# Patient Record
Sex: FEMALE | Race: BLACK OR AFRICAN AMERICAN | Employment: FULL TIME | URBAN - METROPOLITAN AREA
[De-identification: names, ages, dates, MRNs, and addresses within clinical notes are randomized per-mention and may not be internally consistent; named-entity substitution may affect disease eponyms.]

---

## 2018-07-11 ENCOUNTER — HOSPITAL ENCOUNTER (OUTPATIENT)
Dept: PHYSICAL THERAPY | Age: 58
Discharge: HOME | End: 2018-07-11
Payer: COMMERCIAL

## 2018-07-11 PROCEDURE — 97110 THERAPEUTIC EXERCISES: CPT

## 2018-07-11 PROCEDURE — 97162 PT EVAL MOD COMPLEX 30 MIN: CPT

## 2018-07-11 NOTE — PROGRESS NOTES
[facility] - [facility] Farhad Link [address]  [phone] fax  Plan of Care/ Statement of Necessity for Physical Therapy Services     Patient name: Marta Lopez Start of Care: 2018   Referral source: Anuel Potter : 1960    Medical Diagnosis: Low back pain [M54.5]  Right hip pain [M25.551]   Onset Date:18    Treatment Diagnosis: LBP/ Right LE pain   Prior Hospitalization: see medical history Provider#: [id number]   Medications: Verified on Patient summary List    Comorbidities: arthritis, HTN, lupus, depression   Prior Level of Function: Functionally independent. Lives with spouse in single level home with steps to enter. Works full time as a . The Plan of Care and following information is based on the information from the initial evaluation. Assessment/ key information: Patient is a pleasant 61year old female presenting with LBP and Right LE pain progressing over the last year with no known mechanism of injury. Patient has a history of back surgery in  for spinal stenosis, and reports no back pain for the first few years following. Since the onset of this LBP and Right leg pain about a year ago, patient has experienced difficulty walking more than 30 min and standing for more than 5-10 min. Additionally, she experiences difficulty negotiating stairs and sleeping. Symptoms can be relieved by sitting with feet elevated in a recliner. At evaluation, patient demonstrated full and pain free lumbar AROM. Left LE strength grossly 5/5 throughout without pain, but Right LE strength was as follows: knee flexion 4+/5, knee extension 4/5, hip flexion 4/5, hip extension 5/5, hip abduction 5/5. Overall patient is a good rehab candidate due to PLOF and will benefit from skilled physical therapy to address the above deficits.       Evaluation Complexity History MEDIUM  Complexity : 1-2 comorbidities / personal factors will impact the outcome/ POC ; Examination LOW Complexity : 1-2 Standardized tests and measures addressing body structure, function, activity limitation and / or participation in recreation  ;Presentation LOW Complexity : Stable, uncomplicated  ;Clinical Decision Making MEDIUM Complexity : FOTO score of 26-74  Overall Complexity Rating: MEDIUM  Problem List: pain affecting function, decrease ROM, decrease strength, edema affecting function, impaired gait/ balance, decrease ADL/ functional abilitiies, decrease activity tolerance, decrease flexibility/ joint mobility and decrease transfer abilities   Treatment Plan may include any combination of the following: Therapeutic exercise, Therapeutic activities, Neuromuscular re-education, Physical agent/modality, Gait/balance training, Manual therapy, Aquatic therapy, Patient education, Self Care training, Functional mobility training, Home safety training and Stair training  Patient / Family readiness to learn indicated by: asking questions, trying to perform skills and interest  Persons(s) to be included in education: patient (P)  Barriers to Learning/Limitations: None  Patient Goal (s): reduce pain, ability to sleep  Patient Self Reported Health Status: good  Rehabilitation Potential: good    Short Term Goals: To be accomplished in 1 weeks:  Goal: Patient will initiate and be compliant with HEP in order to progress toward long term goals  Status at last note/discharge: issued and reviewed HEP  Long Term Goals:  To be accomplished in 10 treatments:  Goal: Patient will improve FOTO assessment score to 67 in order to indicate improved functional ability  Status at last note/discharge: 56  Goal: Patient will report standing tolerance of at least 20-30 minutes to improve ease of household tasks  Status at last note/discharge: 10-15 min  Goal: Patient will increase Right LE strength to 5/5 throughout in order to improve ease of stair negotitation  Status at last note/discharge:  knee flexion 4+/5, knee extension 4/5, hip flexion 4/5, hip extension 5/5, hip abduction 5/5. Goal: Patient will report at least a 60% overall improvement in order to return to recreational activities  Status at last note/certification: n/a  Frequency / Duration: Patient to be seen 1-2 times per week for 10 treatments. Patient/ Caregiver education and instruction: Diagnosis, prognosis, activity modification and exercises   [x]  Plan of care has been reviewed with JOHN Bennett, LC Connor, PT 7/11/2018 8:55 AM  _____________________________________________________________________  I certify that the above Therapy Services are being furnished while the patient is under my care. I agree with the treatment plan and certify that this therapy is necessary.     Physician's Signature:____________________  Date:__________Time:______    Please sign and return to [facility] - [address]  [phone] fax

## 2018-07-12 ENCOUNTER — HOSPITAL ENCOUNTER (OUTPATIENT)
Dept: PHYSICAL THERAPY | Age: 58
Discharge: HOME | End: 2018-07-12
Payer: COMMERCIAL

## 2018-07-12 PROCEDURE — 97112 NEUROMUSCULAR REEDUCATION: CPT

## 2018-07-12 NOTE — PROGRESS NOTES
PT DAILY TREATMENT NOTE     Patient Name: Roberto Carlos Antunez  Date:2018  : 1960  [x]  Patient  Verified  Payor: BLUE CROSS / Plan: [employer] [facility] [place] / Product Type: PPO /    In time:4:52  Out time:5:25  Total Treatment Time (min): 33  Visit #: 2 of 10    Treatment Area: Low back pain [M54.5]  Right hip pain [M25.551]    SUBJECTIVE  Pain Level (0-10 scale): 3/10  Any medication changes, allergies to medications, adverse drug reactions, diagnosis change, or new procedure performed?: [x] No    [] Yes (see summary sheet for update)  Subjective functional status/changes:   [] No changes reported  \"I have a little pain in my right foot today. \"    OBJECTIVE    33 min Neuromuscular Re-education:  []  See flow sheet :   Rationale: increase strength and increase proprioception  to improve the patients ability to increase core stability, reduce LBP, radicular symptoms          With   [] TE   [] TA   [] neuro   [] other: Patient Education: [x] Review HEP    [] Progressed/Changed HEP based on:   [] positioning   [] body mechanics   [] transfers   [] heat/ice application    [] other:      Other Objective/Functional Measures: Initiated Rx program per flow sheet. Pt given verbal and demonstrative cueing for proper technique. Pain Level (0-10 scale) post treatment: 1/10    ASSESSMENT/Changes in Function: Pt exhibited good core stability with cueing for all exercises. Pt challenged with program but able to complete without increased pain. Pt reported feeling \"much better\" after session and reduction in right foot symptoms.     Patient will continue to benefit from skilled PT services to address functional mobility deficits, address ROM deficits, address strength deficits, analyze and address soft tissue restrictions, analyze and cue movement patterns, analyze and modify body mechanics/ergonomics, assess and modify postural abnormalities and instruct in home and community integration to attain remaining goals. []  See Plan of Care  []  See progress note/recertification  []  See Discharge Summary         Progress towards goals / Updated goals:  Short Term Goals: To be accomplished in 1 weeks:  Goal: Patient will initiate and be compliant with HEP in order to progress toward long term goals  Status at last note/discharge: issued and reviewed HEP  Current status: progressing - Pt has initiated  Long Term Goals: To be accomplished in 10 treatments:  Goal: Patient will improve FOTO assessment score to 67 in order to indicate improved functional ability  Status at last note/discharge: 56  Goal: Patient will report standing tolerance of at least 20-30 minutes to improve ease of household tasks  Status at last note/discharge: 10-15 min  Goal: Patient will increase Right LE strength to 5/5 throughout in order to improve ease of stair negotitation  Status at last note/discharge:  knee flexion 4+/5, knee extension 4/5, hip flexion 4/5, hip extension 5/5, hip abduction 5/5.   Goal: Patient will report at least a 60% overall improvement in order to return to recreational activities  Status at last note/certification: n/a    PLAN  []  Upgrade activities as tolerated     [x]  Continue plan of care  []  Update interventions per flow sheet       []  Discharge due to:_  []  Other:_      Mercedes Miranda, PT 7/12/2018  5:31 PM    Future Appointments  Date Time Provider Colleen Red   7/17/2018 5:00 PM Mercedes Miranda, PT [facility]   7/19/2018 5:00 PM Mercedes Miranda PT [facility]   7/24/2018 5:00 PM Mercedes Miranda PT [facility]   7/26/2018 5:00 PM Mercedes Miranda PT [facility]   7/31/2018 5:00 PM Wendy Miranda, PT Louisa Irizarry

## 2018-07-17 ENCOUNTER — HOSPITAL ENCOUNTER (OUTPATIENT)
Dept: PHYSICAL THERAPY | Age: 58
Discharge: HOME | End: 2018-07-17
Payer: COMMERCIAL

## 2018-07-17 PROCEDURE — 97112 NEUROMUSCULAR REEDUCATION: CPT

## 2018-07-17 NOTE — PROGRESS NOTES
PT DAILY TREATMENT NOTE     Patient Name: Abiodun Johnson  Date:2018  : 1960  [x]  Patient  Verified  Payor: Chirag White / Plan:  [facility] [place] / Product Type: PPO /    In time:4:55  Out time:5:30  Total Treatment Time (min): 35  Visit #: 3 of 10    Treatment Area: Low back pain [M54.5]  Right hip pain [M25.551]    SUBJECTIVE  Pain Level (0-10 scale): 3-4/10  Any medication changes, allergies to medications, adverse drug reactions, diagnosis change, or new procedure performed?: [x] No    [] Yes (see summary sheet for update)  Subjective functional status/changes:   [] No changes reported  \"My lower back and right leg are hurting a little more today. \"    OBJECTIVE    Modality rationale: decrease pain to improve the patients ability to perform ADLs   Min Type Additional Details    [] Estim:  []Unatt       []IFC  []Premod                        []Other:  []w/ice   []w/heat  Position:  Location:    [] Estim: []Att    []TENS instruct  []NMES                    []Other:  []w/US   []w/ice   []w/heat  Position:  Location:    []  Traction: [] Cervical       []Lumbar                       [] Prone          []Supine                       []Intermittent   []Continuous Lbs:  [] before manual  [] after manual    []  Ultrasound: []Continuous   [] Pulsed                           []1MHz   []3MHz Location:  W/cm2:    []  Iontophoresis with dexamethasone         Location: [] Take home patch   [] In clinic   10 [x]  Ice     []  heat  []  Ice massage  []  Laser   []  Anodyne Position: seated  Location: low back    []  Laser with stim  []  Other: Position:  Location:    []  Vasopneumatic Device Pressure:       [] lo [] med [] hi   Temperature: [] lo [] med [] hi   [] Skin assessment post-treatment:  []intact []redness- no adverse reaction    []redness - adverse reaction:     25 min Neuromuscular Re-education:  []  See flow sheet :   Rationale: increase strength and increase proprioception  to improve the patients ability to increase core stability, reduce LBP, radicular symptoms          With   [] TE   [] TA   [] neuro   [] other: Patient Education: [x] Review HEP    [] Progressed/Changed HEP based on:   [] positioning   [] body mechanics   [] transfers   [] heat/ice application    [] other:      Other Objective/Functional Measures: Continued with Rx program per flow sheet. Pain Level (0-10 scale) post treatment: 2/10    ASSESSMENT/Changes in Function: Pt continues to exhibit good core stability and breathing techniques with exercises. Pt progressing towards LTGs. Will monitor right LE symptoms in relation of lower back pain to determine need for adjusting Rx program.    Patient will continue to benefit from skilled PT services to address functional mobility deficits, address ROM deficits, address strength deficits, analyze and address soft tissue restrictions, analyze and cue movement patterns, analyze and modify body mechanics/ergonomics, assess and modify postural abnormalities and instruct in home and community integration to attain remaining goals. []  See Plan of Care  []  See progress note/recertification  []  See Discharge Summary         Progress towards goals / Updated goals:  Short Term Goals: To be accomplished in 1 weeks:  Goal: Patient will initiate and be compliant with HEP in order to progress toward long term goals  Status at last note/discharge: issued and reviewed HEP  Current status: met - Pt reports compliance  Long Term Goals:  To be accomplished in 10 treatments:  Goal: Patient will improve FOTO assessment score to 67 in order to indicate improved functional ability  Status at last note/discharge: 56  Current status: reassess visit 5  Goal: Patient will report standing tolerance of at least 20-30 minutes to improve ease of household tasks  Status at last note/discharge: 10-15 min  Current status:  Goal: Patient will increase Right LE strength to 5/5 throughout in order to improve ease of stair negotitation  Status at last note/discharge:  knee flexion 4+/5, knee extension 4/5, hip flexion 4/5, hip extension 5/5, hip abduction 5/5.   Current status:  Goal: Patient will report at least a 60% overall improvement in order to return to recreational activities  Status at last note/certification: n/a  Current status:     PLAN  []  Upgrade activities as tolerated     [x]  Continue plan of care  []  Update interventions per flow sheet       []  Discharge due to:_  []  Other:_      Isabel Miranda, PT 7/17/2018  5:31 PM    Future Appointments  Date Time Provider Colleen Red   7/19/2018 5:00 PM Isabel Miranda, PT [facility]   7/24/2018 5:00 PM [facility]   7/26/2018 5:00 PM Isabel Miranda, PT [facility]   7/31/2018 5:00 PM Wendy Miranda, PT [facility]

## 2018-07-19 ENCOUNTER — HOSPITAL ENCOUNTER (OUTPATIENT)
Dept: PHYSICAL THERAPY | Age: 58
Discharge: HOME | End: 2018-07-19
Payer: COMMERCIAL

## 2018-07-19 PROCEDURE — 97112 NEUROMUSCULAR REEDUCATION: CPT

## 2018-07-19 NOTE — PROGRESS NOTES
PT DAILY TREATMENT NOTE     Patient Name: Marta Lopez  Date:2018  : 1960  [x]  Patient  Verified  Payor: BLUE CROSS / Plan:  [facility] [place] / Product Type: PPO /    In time:4:55  Out time:5:25  Total Treatment Time (min): 30  Visit #: 4 of 10    Treatment Area: Low back pain [M54.5]  Right hip pain [M25.551]    SUBJECTIVE  Pain Level (0-10 scale): 1-2/10  Any medication changes, allergies to medications, adverse drug reactions, diagnosis change, or new procedure performed?: [x] No    [] Yes (see summary sheet for update)  Subjective functional status/changes:   [] No changes reported  \"I don't have much pain today. \"    OBJECTIVE    30 min Neuromuscular Re-education:  []  See flow sheet :   Rationale: increase strength and increase proprioception  to improve the patients ability to increase core stability, reduce LBP, radicular symptoms          With   [] TE   [] TA   [] neuro   [] other: Patient Education: [x] Review HEP    [] Progressed/Changed HEP based on:   [] positioning   [] body mechanics   [] transfers   [] heat/ice application    [] other:      Other Objective/Functional Measures: Changed to deadbugs on Oov. Added holds to pelvic clocks. Pt declined ice due to low pain level. Pain Level (0-10 scale) post treatment: 1/10    ASSESSMENT/Changes in Function: Pt reports being able to sleep on right side with less pain now. Pt only waking one time at night compared to 3-5 times. Pt able to amb x 1 mile yesterday without any problems. Patient will continue to benefit from skilled PT services to address functional mobility deficits, address ROM deficits, address strength deficits, analyze and address soft tissue restrictions, analyze and cue movement patterns, analyze and modify body mechanics/ergonomics, assess and modify postural abnormalities and instruct in home and community integration to attain remaining goals.      []  See Plan of Care  []  See progress note/recertification  []  See Discharge Summary         Progress towards goals / Updated goals:  Short Term Goals: To be accomplished in 1 weeks:  Goal: Patient will initiate and be compliant with HEP in order to progress toward long term goals  Status at last note/discharge: issued and reviewed HEP  Current status: met - Pt reports compliance  Long Term Goals: To be accomplished in 10 treatments:  Goal: Patient will improve FOTO assessment score to 67 in order to indicate improved functional ability  Status at last note/discharge: 56  Current status: reassess visit 5  Goal: Patient will report standing tolerance of at least 20-30 minutes to improve ease of household tasks  Status at last note/discharge: 10-15 min  Current status: progressing - 15 minute tolerance  Goal: Patient will increase Right LE strength to 5/5 throughout in order to improve ease of stair negotitation  Status at last note/discharge:  knee flexion 4+/5, knee extension 4/5, hip flexion 4/5, hip extension 5/5, hip abduction 5/5.   Current status: met - 5/5 right hip/knee strength grossly  Goal: Patient will report at least a 60% overall improvement in order to return to recreational activities  Status at last note/certification: n/a  Current status: progressing - 30% improved    PLAN  []  Upgrade activities as tolerated     [x]  Continue plan of care  []  Update interventions per flow sheet       []  Discharge due to:_  []  Other:_      Tony Miranda, PHILIPPE 7/19/2018  5:31 PM    Future Appointments  Date Time Provider Colleen Red   7/24/2018 5:00 PM Tony Miranda, PT [facility]   7/26/2018 5:00 PM Tony Miranda, PHILIPPE [facility]   7/31/2018 5:00 PM Wendy Miranda, PT Charl[facility] Goodell

## 2018-07-24 ENCOUNTER — HOSPITAL ENCOUNTER (OUTPATIENT)
Dept: PHYSICAL THERAPY | Age: 58
Discharge: HOME | End: 2018-07-24
Payer: COMMERCIAL

## 2018-07-24 PROCEDURE — 97112 NEUROMUSCULAR REEDUCATION: CPT

## 2018-07-24 NOTE — PROGRESS NOTES
PT DAILY TREATMENT NOTE     Patient Name: Greer Murry  Date:2018  : 1960  [x]  Patient  Verified  Payor: Candice Varela / Plan:  [facility] [place] / Product Type: PPO /    In time:5:00  Out time:5:33  Total Treatment Time (min): 33  Visit #: 4 of 10    Treatment Area: Low back pain [M54.5]  Right hip pain [M25.551]    SUBJECTIVE  Pain Level (0-10 scale): 0/10  Any medication changes, allergies to medications, adverse drug reactions, diagnosis change, or new procedure performed?: [x] No    [] Yes (see summary sheet for update)  Subjective functional status/changes:   [] No changes reported  \"I feel pretty good but I have noticed some numbness in my right foot which I didn't have. The numbness in my thigh is barely there anymore. I can lie on the right hip at night now without issues. My back feels better. \"    OBJECTIVE    33 min Neuromuscular Re-education:  []  See flow sheet :   Rationale: increase strength and increase proprioception  to improve the patients ability to increase core stability, reduce LBP, radicular symptoms          With   [] TE   [] TA   [] neuro   [] other: Patient Education: [x] Review HEP    [] Progressed/Changed HEP based on:   [] positioning   [] body mechanics   [] transfers   [] heat/ice application    [] other:      Other Objective/Functional Measures: Progressed from Oov today as Pt exhibits good core stability and control. Progressed overall Rx program.     Pain Level (0-10 scale) post treatment: 0/10    ASSESSMENT/Changes in Function: Pt able to report resolution of right foot symptoms after exercises today. Will continue to progress per Pt tolerance.     Patient will continue to benefit from skilled PT services to address functional mobility deficits, address ROM deficits, address strength deficits, analyze and address soft tissue restrictions, analyze and cue movement patterns, analyze and modify body mechanics/ergonomics, assess and modify postural abnormalities and instruct in home and community integration to attain remaining goals. []  See Plan of Care  []  See progress note/recertification  []  See Discharge Summary         Progress towards goals / Updated goals:  Short Term Goals: To be accomplished in 1 weeks:  Goal: Patient will initiate and be compliant with HEP in order to progress toward long term goals  Status at last note/discharge: issued and reviewed HEP  Current status: met - Pt reports compliance  Long Term Goals: To be accomplished in 10 treatments:  Goal: Patient will improve FOTO assessment score to 67 in order to indicate improved functional ability  Status at last note/discharge: 56  Current status: progressing - FOTO 59 pts  Goal: Patient will report standing tolerance of at least 20-30 minutes to improve ease of household tasks  Status at last note/discharge: 10-15 min  Current status: progressing - 15 minute tolerance  Goal: Patient will increase Right LE strength to 5/5 throughout in order to improve ease of stair negotitation  Status at last note/discharge:  knee flexion 4+/5, knee extension 4/5, hip flexion 4/5, hip extension 5/5, hip abduction 5/5.   Current status: met - 5/5 right hip/knee strength grossly  Goal: Patient will report at least a 60% overall improvement in order to return to recreational activities  Status at last note/certification: n/a  Current status: progressing - 30% improved    PLAN  [x]  Upgrade activities as tolerated     [x]  Continue plan of care  []  Update interventions per flow sheet       []  Discharge due to:_  []  Other:_      Liat Miranda PT 7/24/2018  5:31 PM    Future Appointments  Date Time Provider Colleen Red   7/26/2018 5:00 PM Liat Miranda PT [facility]   7/31/2018 5:00 PM Wendy Miranda, PT [facility]

## 2018-07-26 ENCOUNTER — HOSPITAL ENCOUNTER (OUTPATIENT)
Dept: PHYSICAL THERAPY | Age: 58
Discharge: HOME | End: 2018-07-26
Payer: COMMERCIAL

## 2018-07-26 PROCEDURE — 97112 NEUROMUSCULAR REEDUCATION: CPT

## 2018-07-26 PROCEDURE — 97140 MANUAL THERAPY 1/> REGIONS: CPT

## 2018-07-26 NOTE — PROGRESS NOTES
PT DAILY TREATMENT NOTE     Patient Name: Eder Tay  Date:2018  : 1960  [x]  Patient  Verified  Payor: Enmanuel Pulido / Plan:  [facility] / Product Type: PPO /    In time:5:00  Out time:5:35  Total Treatment Time (min): 35  Visit #: 6 of 10    Treatment Area: Low back pain [M54.5]  Right hip pain [M25.551]    SUBJECTIVE  Pain Level (0-10 scale): 4/10  Any medication changes, allergies to medications, adverse drug reactions, diagnosis change, or new procedure performed?: [x] No    [] Yes (see summary sheet for update)  Subjective functional status/changes:   [] No changes reported  \"I have some pain in my right thigh and leg today. It started early this morning. It woke me up. \"    OBJECTIVE    27 min Neuromuscular Re-education:  []  See flow sheet :   Rationale: increase strength and increase proprioception  to improve the patients ability to increase core stability, reduce LBP, radicular symptoms      8 min Manual Therapy:  MET leg pull to correct right ilial upslip   Rationale: decrease pain to normalize gait        With   [] TE   [] TA   [] neuro   [] other: Patient Education: [x] Review HEP    [] Progressed/Changed HEP based on:   [] positioning   [] body mechanics   [] transfers   [] heat/ice application    [] other:      Other Objective/Functional Measures: Pt amb into clinic with antalgic limp. Continued with progressed Rx program today. Pain Level (0-10 scale) post treatment: 0/10    ASSESSMENT/Changes in Function: Pt reported 2/10 pain after manual therapy correction. After program, Pt able to report full resolution of symptoms after session. Updated HEP given and Pt reported complete understanding. Pt to finish remaining sessions and then D/C.     Patient will continue to benefit from skilled PT services to address functional mobility deficits, address ROM deficits, address strength deficits, analyze and address soft tissue restrictions, analyze and cue movement patterns, analyze and modify body mechanics/ergonomics, assess and modify postural abnormalities and instruct in home and community integration to attain remaining goals. []  See Plan of Care  []  See progress note/recertification  []  See Discharge Summary         Progress towards goals / Updated goals:  Short Term Goals: To be accomplished in 1 weeks:  Goal: Patient will initiate and be compliant with HEP in order to progress toward long term goals  Status at last note/discharge: issued and reviewed HEP  Current status: met - Pt reports compliance  Long Term Goals: To be accomplished in 10 treatments:  Goal: Patient will improve FOTO assessment score to 67 in order to indicate improved functional ability  Status at last note/discharge: 56  Current status: progressing - FOTO 59 pts  Goal: Patient will report standing tolerance of at least 20-30 minutes to improve ease of household tasks  Status at last note/discharge: 10-15 min  Current status: progressing - 15 minute tolerance  Goal: Patient will increase Right LE strength to 5/5 throughout in order to improve ease of stair negotitation  Status at last note/discharge:  knee flexion 4+/5, knee extension 4/5, hip flexion 4/5, hip extension 5/5, hip abduction 5/5.   Current status: met - 5/5 right hip/knee strength grossly  Goal: Patient will report at least a 60% overall improvement in order to return to recreational activities  Status at last note/certification: n/a  Current status: progressing - 30% improved    PLAN  [x]  Upgrade activities as tolerated     [x]  Continue plan of care  []  Update interventions per flow sheet       []  Discharge due to:_  []  Other:_      Anderson Miranda, PT 7/26/2018  5:31 PM    Future Appointments  Date Time Provider Colleen Rde   7/31/2018 5:00 PM Anderson Miranda, PT [facility]   8/3/2018 4:00 PM Gabriella Gutierrez, PT [facility]   8/7/2018 5:00 PM Anderson Miranda, PT [facility]   8/9/2018 4:00 PM Moe Ocampo, [facility]

## 2018-07-31 ENCOUNTER — HOSPITAL ENCOUNTER (OUTPATIENT)
Dept: PHYSICAL THERAPY | Age: 58
Discharge: HOME | End: 2018-07-31
Payer: COMMERCIAL

## 2018-07-31 PROCEDURE — 97112 NEUROMUSCULAR REEDUCATION: CPT

## 2018-07-31 NOTE — PROGRESS NOTES
PT DAILY TREATMENT NOTE     Patient Name: Roberto Carlos Antunez  Date:2018  : 1960  [x]  Patient  Verified  Payor: BLUE CROSS / Plan: [facility] / Product Type: PPO /    In time:4:50  Out time:5:25  Total Treatment Time (min): 35  Visit #: 7 of 10    Treatment Area: Low back pain [M54.5]  Right hip pain [M25.551]    SUBJECTIVE  Pain Level (0-10 scale): 4/10  Any medication changes, allergies to medications, adverse drug reactions, diagnosis change, or new procedure performed?: [x] No    [] Yes (see summary sheet for update)  Subjective functional status/changes:   [] No changes reported  \"My lower back really hurts today. It started yesterday. I was at the hospital all day with my dtr. \"    OBJECTIVE    Modality rationale: decrease inflammation and decrease pain to improve the patients ability to perform ADLs   Min Type Additional Details    [] Estim:  []Unatt       []IFC  []Premod                        []Other:  []w/ice   []w/heat  Position:  Location:    [] Estim: []Att    []TENS instruct  []NMES                    []Other:  []w/US   []w/ice   []w/heat  Position:  Location:    []  Traction: [] Cervical       []Lumbar                       [] Prone          []Supine                       []Intermittent   []Continuous Lbs:  [] before manual  [] after manual    []  Ultrasound: []Continuous   [] Pulsed                           []1MHz   []3MHz Location:  W/cm2:    []  Iontophoresis with dexamethasone         Location: [] Take home patch   [] In clinic:   10 [x]  Ice     []  heat  []  Ice massage  []  Laser   []  Anodyne Position: seated  Location: low back    []  Laser with stim  []  Other: Position:  Location:    []  Vasopneumatic Device Pressure:       [] lo [] med [] hi   Temperature: [] lo [] med [] hi   [] Skin assessment post-treatment:  []intact []redness- no adverse reaction    []redness - adverse reaction:     25 min Neuromuscular Re-education:  []  See flow sheet : Rationale: increase strength and increase proprioception  to improve the patients ability to increase core stability, reduce LBP, radicular symptoms          With   [] TE   [] TA   [] neuro   [] other: Patient Education: [x] Review HEP    [] Progressed/Changed HEP based on:   [] positioning   [] body mechanics   [] transfers   [] heat/ice application    [] other:      Other Objective/Functional Measures: Pt amb into clinic with stiff posture from increased lower back pain. Pt able to complete full program.    Pain Level (0-10 scale) post treatment: 4/10    ASSESSMENT/Changes in Function: Pt progressing with therapy. No changes in goal status at this time due to increased pain today. Patient will continue to benefit from skilled PT services to address functional mobility deficits, address ROM deficits, address strength deficits, analyze and address soft tissue restrictions, analyze and cue movement patterns, analyze and modify body mechanics/ergonomics, assess and modify postural abnormalities and instruct in home and community integration to attain remaining goals. []  See Plan of Care  []  See progress note/recertification  []  See Discharge Summary         Progress towards goals / Updated goals:  Short Term Goals: To be accomplished in 1 weeks:  Goal: Patient will initiate and be compliant with HEP in order to progress toward long term goals  Status at last note/discharge: issued and reviewed HEP  Current status: met - Pt reports compliance  Long Term Goals:  To be accomplished in 10 treatments:  Goal: Patient will improve FOTO assessment score to 67 in order to indicate improved functional ability  Status at last note/discharge: 56  Current status: progressing - FOTO 59 pts  Goal: Patient will report standing tolerance of at least 20-30 minutes to improve ease of household tasks  Status at last note/discharge: 10-15 min  Current status: progressing - 15 minute tolerance  Goal: Patient will increase Right LE strength to 5/5 throughout in order to improve ease of stair negotitation  Status at last note/discharge:  knee flexion 4+/5, knee extension 4/5, hip flexion 4/5, hip extension 5/5, hip abduction 5/5.   Current status: met - 5/5 right hip/knee strength grossly  Goal: Patient will report at least a 60% overall improvement in order to return to recreational activities  Status at last note/certification: n/a  Current status: progressing - 30% improved    PLAN  [x]  Upgrade activities as tolerated     [x]  Continue plan of care  []  Update interventions per flow sheet       []  Discharge due to:_  [x]  Other:_ goals next visit     Jozef Miranda, PT 7/31/2018  5:31 PM    Future Appointments  Date Time Provider Colleen Red   8/3/2018 4:00 PM Nadine Guerrier, [facility]   8/7/2018 5:00 PM Jozef Miranda [facility]   8/9/2018 4:00 PM Alfonzo Pace, [facility]

## 2018-08-03 ENCOUNTER — HOSPITAL ENCOUNTER (OUTPATIENT)
Dept: PHYSICAL THERAPY | Age: 58
End: 2018-08-03
Payer: COMMERCIAL

## 2018-08-07 ENCOUNTER — HOSPITAL ENCOUNTER (OUTPATIENT)
Dept: PHYSICAL THERAPY | Age: 58
Discharge: HOME | End: 2018-08-07
Payer: COMMERCIAL

## 2018-08-07 PROCEDURE — 97112 NEUROMUSCULAR REEDUCATION: CPT

## 2018-08-07 NOTE — PROGRESS NOTES
PT DAILY TREATMENT NOTE     Patient Name: Sil Bell  Date:2018  : 1960  [x]  Patient  Verified  Payor: BLUE CROSS / Plan:  [facility] / Product Type: PPO /    In time:5:00  Out time:5:25  Total Treatment Time (min): 25  Visit #: 8 of 10    Treatment Area: Low back pain [M54.5]  Right hip pain [M25.551]    SUBJECTIVE  Pain Level (0-10 scale): 5/10  Any medication changes, allergies to medications, adverse drug reactions, diagnosis change, or new procedure performed?: [x] No    [] Yes (see summary sheet for update)  Subjective functional status/changes:   [] No changes reported  \"I've been in pain the past few days. I think I am in a Lupus flare. \"    OBJECTIVE    25 min Neuromuscular Re-education:  []  See flow sheet :   Rationale: increase strength and increase proprioception  to improve the patients ability to increase core stability, reduce LBP, radicular symptoms          With   [] TE   [] TA   [] neuro   [] other: Patient Education: [x] Review HEP    [] Progressed/Changed HEP based on:   [] positioning   [] body mechanics   [] transfers   [] heat/ice application    [] other:      Other Objective/Functional Measures: Increased Rx program per flow sheet for further core and glute strengthening. Pain Level (0-10 scale) post treatment: 4/10    ASSESSMENT/Changes in Function: Pt in increased pain today possibly due to Lupus flare as tightness and pain noted in different jts per Pt. Will continue to monitor. Pt to finish out remaining visits and D/C to HEP. Patient will continue to benefit from skilled PT services to address functional mobility deficits, address ROM deficits, address strength deficits, analyze and address soft tissue restrictions, analyze and cue movement patterns, analyze and modify body mechanics/ergonomics, assess and modify postural abnormalities and instruct in home and community integration to attain remaining goals.      []  See Plan of Care  [] See progress note/recertification  []  See Discharge Summary         Progress towards goals / Updated goals:  Short Term Goals: To be accomplished in 1 weeks:  Goal: Patient will initiate and be compliant with HEP in order to progress toward long term goals  Status at last note/discharge: issued and reviewed HEP  Current status: met - Pt reports compliance  Long Term Goals: To be accomplished in 10 treatments:  Goal: Patient will improve FOTO assessment score to 67 in order to indicate improved functional ability  Status at last note/discharge: 56  Current status: progressing - FOTO 59 pts  Goal: Patient will report standing tolerance of at least 20-30 minutes to improve ease of household tasks  Status at last note/discharge: 10-15 min  Current status: progressing - 15 minute tolerance  Goal: Patient will increase Right LE strength to 5/5 throughout in order to improve ease of stair negotitation  Status at last note/discharge:  knee flexion 4+/5, knee extension 4/5, hip flexion 4/5, hip extension 5/5, hip abduction 5/5.   Current status: met - 5/5 right hip/knee strength grossly  Goal: Patient will report at least a 60% overall improvement in order to return to recreational activities  Status at last note/certification: n/a  Current status: progressing - 30% improved    PLAN  [x]  Upgrade activities as tolerated     [x]  Continue plan of care  []  Update interventions per flow sheet       []  Discharge due to:_  [x]  Other:_ goals next visit     Richard Miranda, PT 8/7/2018  5:31 PM    Future Appointments  Date Time Provider Colleen Red   8/9/2018 4:00 PM Rekha Mccartney PTA [facility]

## 2018-08-09 ENCOUNTER — HOSPITAL ENCOUNTER (OUTPATIENT)
Dept: PHYSICAL THERAPY | Age: 58
Discharge: HOME | End: 2018-08-09
Payer: COMMERCIAL

## 2018-08-09 PROCEDURE — 97112 NEUROMUSCULAR REEDUCATION: CPT

## 2018-08-09 NOTE — PROGRESS NOTES
PT DAILY TREATMENT NOTE     Patient Name: Harshad Fields  Date:2018  : 1960  [x]  Patient  Verified  Payor: BLUE CROSS / Plan: [facility] / Product Type: PPO /    In time:4:00  Out time:4:37  Total Treatment Time (min): 37  1:1 time:27  Visit #: 9 of 10    Treatment Area: Low back pain [M54.5]  Right hip pain [M25.551]    SUBJECTIVE  Pain Level (0-10 scale): 0/10  Any medication changes, allergies to medications, adverse drug reactions, diagnosis change, or new procedure performed?: [x] No    [] Yes (see summary sheet for update)  Subjective functional status/changes:   [] No changes reported  \"No p! I'm just sore. \"    OBJECTIVE  Modality rationale: decrease inflammation and decrease pain to improve the patients ability to perform ADLs   Min Type Additional Details     [] Estim:  []Unatt       []IFC  []Premod                        []Other:  []w/ice   []w/heat  Position:  Location:     [] Estim: []Att    []TENS instruct  []NMES                    []Other:  []w/US   []w/ice   []w/heat  Position:  Location:     []  Traction: [] Cervical       []Lumbar                       [] Prone          []Supine                       []Intermittent   []Continuous Lbs:  [] before manual  [] after manual     []  Ultrasound: []Continuous   [] Pulsed                           []1MHz   []3MHz Location:  W/cm2:     []  Iontophoresis with dexamethasone         Location: [] Take home patch   [] In clinic:   10 [x]  Ice     []  heat  []  Ice massage  []  Laser   []  Anodyne Position: seated  Location: low back     []  Laser with stim  []  Other: Position:  Location:     []  Vasopneumatic Device Pressure:       [] lo [] med [] hi   Temperature: [] lo [] med [] hi   [x] Skin assessment post-treatment:  [x]intact [x]redness- no adverse reaction    []redness - adverse reaction:       27 min Neuromuscular Re-education:  []  See flow sheet :   Rationale: increase strength and increase proprioception  to improve the patients ability to increase core stability, reduce LBP, radicular symptoms          With   [] TE   [] TA   [] neuro   [] other: Patient Education: [x] Review HEP    [] Progressed/Changed HEP based on:   [] positioning   [] body mechanics   [] transfers   [] heat/ice application    [] other:      Other Objective/Functional Measures:     Pain Level (0-10 scale) post treatment: 0/10    ASSESSMENT/Changes in Function: D/C next visit. No p! Was reported during or after therapy. Patient will continue to benefit from skilled PT services to address functional mobility deficits, address ROM deficits, address strength deficits, analyze and address soft tissue restrictions, analyze and cue movement patterns, analyze and modify body mechanics/ergonomics, assess and modify postural abnormalities and instruct in home and community integration to attain remaining goals. []  See Plan of Care  []  See progress note/recertification  []  See Discharge Summary         Progress towards goals / Updated goals:  Short Term Goals: To be accomplished in 1 weeks:  Goal: Patient will initiate and be compliant with HEP in order to progress toward long term goals  Status at last note/discharge: issued and reviewed HEP  Current status: met - Pt reports compliance  Long Term Goals:  To be accomplished in 10 treatments:  Goal: Patient will improve FOTO assessment score to 67 in order to indicate improved functional ability  Status at last note/discharge: 56  Current status: progressing - FOTO 59 pts  Goal: Patient will report standing tolerance of at least 20-30 minutes to improve ease of household tasks  Status at last note/discharge: 10-15 min  Current status: progressing - 15 minute tolerance  Goal: Patient will increase Right LE strength to 5/5 throughout in order to improve ease of stair negotitation  Status at last note/discharge:  knee flexion 4+/5, knee extension 4/5, hip flexion 4/5, hip extension 5/5, hip abduction 5/5.  Current status: met - 5/5 right hip/knee strength grossly  Goal: Patient will report at least a 60% overall improvement in order to return to recreational activities  Status at last note/certification: n/a  Current status: progressing - 30% improved    PLAN  [x]  Upgrade activities as tolerated     [x]  Continue plan of care  []  Update interventions per flow sheet       []  Discharge due to:_  [x]  Other:_ goals next visit     Tushar Chance PTA 8/9/2018  5:31 PM    No future appointments.

## 2018-08-15 ENCOUNTER — HOSPITAL ENCOUNTER (OUTPATIENT)
Dept: PHYSICAL THERAPY | Age: 58
Discharge: HOME | End: 2018-08-15
Payer: COMMERCIAL

## 2018-08-15 PROCEDURE — 97112 NEUROMUSCULAR REEDUCATION: CPT

## 2018-08-15 NOTE — PROGRESS NOTES
PT DAILY TREATMENT NOTE     Patient Name: Kasey Gonsalves  Date:8/15/2018  : 1960  [x]  Patient  Verified  Payor: BLUE CROSS / Plan: [facility] / Product Type: PPO /    In time:4:00  Out time:4;35  Total Treatment Time (min): 35  (1:1 time) 15 minutes  Visit #: 10 of 10    Treatment Area: Low back pain [M54.5]  Right hip pain [M25.551]    SUBJECTIVE  Pain Level (0-10 scale): 0  Any medication changes, allergies to medications, adverse drug reactions, diagnosis change, or new procedure performed?: [x] No    [] Yes (see summary sheet for update)  Subjective functional status/changes:   [] No changes reported  I've learned to manage my pain better    OBJECTIVE    35 min Neuromuscular Re-education:  []  See flow sheet :   Rationale: increase strength and improve coordination  to improve the patients ability to reduce radicular symptoms, increase core stability          With   [] TE   [] TA   [] neuro   [] other: Patient Education: [x] Review HEP    [] Progressed/Changed HEP based on:   [] positioning   [] body mechanics   [] transfers   [] heat/ice application    [] other:      Other Objective/Functional Measures:   Gains: able to better manage pain with exercise   Deficits: mainly limited in standing, but otherwise no significant functional limitations    Pain Level (0-10 scale) post treatment:  0    ASSESSMENT/Changes in Function:  See goals     []  See Plan of Care  []  See progress note/recertification  [x]  See Discharge Summary         Progress towards goals / Updated goals:  Goal: Patient will initiate and be compliant with HEP in order to progress toward long term goals  Status at last note/discharge: issued and reviewed HEP  Current status: met - Pt reports compliance  Long Term Goals: To be accomplished in 10 treatments:  Goal: Patient will improve FOTO assessment score to 67 in order to indicate improved functional ability  Status at last note/discharge: 56  Current status: progressing - FOTO 59 pts  Goal: Patient will report standing tolerance of at least 20-30 minutes to improve ease of household tasks  Status at last note/discharge: 10-15 min  Current status: progressing - 30 minute walking, standing 15 minutes Met  Goal: Patient will increase Right LE strength to 5/5 throughout in order to improve ease of stair negotitation  Status at last note/discharge:  knee flexion 4+/5, knee extension 4/5, hip flexion 4/5, hip extension 5/5, hip abduction 5/5. Current status: met - 5/5 right hip/knee strength grossly  Goal: Patient will report at least a 60% overall improvement in order to return to recreational activities  Status at last note/certification: n/a  Current status: progressing - 45% improved    PLAN  []  Upgrade activities as tolerated     []  Continue plan of care  []  Update interventions per flow sheet       [x]  Discharge due to:_  []  Other:_      Dedrick Jay, PTA 8/15/2018  4:27 PM    No future appointments.

## 2018-08-20 NOTE — PROGRESS NOTES
[facility] - [facility], [address] [place] [address]  [phone] fax    Discharge Summary    Patient name: Marta Lopez Start of Care: 2018   Referral source: Anuel Potter : 1960                          Medical Diagnosis: Low back pain [M54.5]  Right hip pain [M25.551] Onset Date:18                          Treatment Diagnosis: LBP/ Right LE pain   Prior Hospitalization: see medical history Provider#: [id number]   Medications: Verified on Patient summary List    Comorbidities: arthritis, HTN, lupus, depression   Prior Level of Function: Functionally independent. Lives with spouse in single level home with steps to enter. Works full time as a . Visits from Start of Care: 10    Missed Visits: 1    Reporting Period : 18 to 18     Short Term Goals: To be accomplished in 1 week:  Goal: Patient will initiate and be compliant with HEP in order to progress toward long term goals  Status at last note/discharge: issued and reviewed HEP  Current status: met - Pt reports compliance  Long Term Goals: To be accomplished in 10 treatments:  Goal: Patient will improve FOTO assessment score to 67 in order to indicate improved functional ability  Status at last note/discharge: 56  Current status: progressing - FOTO 59 pts  Goal: Patient will report standing tolerance of at least 20-30 minutes to improve ease of household tasks  Status at last note/discharge: 10-15 min  Current status: progressing - 30 minute walking, standing 15 minutes Met  Goal: Patient will increase Right LE strength to 5/5 throughout in order to improve ease of stair negotitation  Status at last note/discharge:  knee flexion 4+/5, knee extension 4/5, hip flexion 4/5, hip extension 5/5, hip abduction 5/5.   Current status: met - 5/5 right hip/knee strength grossly  Goal: Patient will report at least a 60% overall improvement in order to return to recreational activities  Status at last note/certification: n/a  Current status: progressing - 45% improved    Assessment/ Summary of Care: Pt made good progress with therapy. At time of last visit, Pt reported being hannah to better manage pain now and no functional limitations except some limitations in standing tolerance. Pt has met or is progressing towards all goals. Pt is appropriate for D/C at this time to [facility].   Thank you for this referral.    RECOMMENDATIONS:  [x]Discontinue therapy: [x]Patient has reached or is progressing toward set goals      []Patient is non-compliant or has abdicated      []Due to lack of appreciable progress towards set goals    Nasrin Miranda, PT 8/20/2018 9:02 AM

## 2020-08-26 ENCOUNTER — HOSPITAL ENCOUNTER (OUTPATIENT)
Dept: PHYSICAL THERAPY | Age: 60
Discharge: HOME | End: 2020-08-26
Payer: COMMERCIAL

## 2020-08-26 PROCEDURE — 97140 MANUAL THERAPY 1/> REGIONS: CPT

## 2020-08-26 PROCEDURE — 97110 THERAPEUTIC EXERCISES: CPT

## 2020-08-26 PROCEDURE — 97162 PT EVAL MOD COMPLEX 30 MIN: CPT

## 2020-08-26 NOTE — PROGRESS NOTES
[facility]  [address]  [place], [address]  [phone] fax    Plan of Care/ Statement of Necessity for Physical Therapy Services    Patient name: Rico Encinas Start of Care: 2020   Referral source: Humza Flores MD : 1960    Medical Diagnosis: Low back pain [M54.5]  Payor: Candis Barboza / Plan:  [facility] / Product Type: PPO /  Onset Date:7/10/2020    Treatment Diagnosis: LBP   Prior Hospitalization: see medical history Provider#: [id number]   Medications: Verified on Patient summary List    Comorbidities: Arthritis, Depression, HTN, Lumbar fusion, hysterectomy and gall bladder removal.   Prior Level of Function: The patient reports that she had pain with all activity, walking and chore production prior to onset. The Plan of Care and following information is based on the information from the initial evaluation. Assessment/ key information: The patient is a 60year old female s/p lumbar fusion of L5-S1 performed on 7/10/2020. She denies complications, but states she had spasms into her right thigh which have improved over the last week. She states her low back has been sore since the surgery. She presents with closed incisions, no redness, and no drainage, void of infection signs. The patient has impairments related to the procedure consisting of pain, decreased ROM, decreased flexibility, decreased strength, and limited ADL ease. The patient will benefit from skilled PT I order to address the aforementioned impairments. Evaluation Complexity History HIGH Complexity :3+ comorbidities / personal factors will impact the outcome/ POC ; Examination MEDIUM Complexity : 3 Standardized tests and measures addressing body structure, function, activity limitation and / or participation in recreation  ;Presentation MEDIUM Complexity : Evolving with changing characteristics  ; Clinical Decision Making MEDIUM Complexity : FOTO score of 26-74  Overall Complexity Rating: MEDIUM  Problem List: pain affecting function, decrease ROM, decrease strength, decrease ADL/ functional abilitiies, decrease activity tolerance, decrease flexibility/ joint mobility and decrease transfer abilities   Treatment Plan may include any combination of the following: Therapeutic exercise, Therapeutic activities, Neuromuscular re-education, Physical agent/modality, Manual therapy, Patient education, Functional mobility training and Home safety training  Patient / Family readiness to learn indicated by: asking questions, trying to perform skills and interest  Persons(s) to be included in education: patient (P)  Barriers to Learning/Limitations: None  Patient Goal (s): less back pain and decrease left leg pain.   Patient Self Reported Health Status: good  Rehabilitation Potential: good    Short Term Goals: To be accomplished in 2 weeks:   1. The patient will demonstrate independence and compliance with HEP to maximize ADL ease. 2. The patient will improve sit to stand without quad pain to improve ease of transfers  Long Term Goals: To be accomplished in 4 weeks:   1. The patient will improve FOTO score to 48 to maximize quality of life. 2. The patient will perform chores and housework with moderate difficulty to improve ease of chore production. 3. The patient will improve hip ABD/EXT to 4+/5 MMT to improve stability in stance. 4. The patient will report being able to sleep through the night without awakening due to pain to improve ease of ADL efficiency. Frequency / Duration: Patient to be seen 2 times per week for 4 weeks.     Patient/ Caregiver education and instruction: Diagnosis, prognosis, self care, activity modification and exercises   [x]  Plan of care has been reviewed with JOHN Martines, PT 8/26/2020 3:07 PM    ________________________________________________________________________    I certify that the above Therapy Services are being furnished while the patient is under my care. I agree with the treatment plan and certify that this therapy is necessary.     Physician's Signature:____________Date:_________TIME:________    ** Signature, Date and Time must be completed for valid certification **    Please sign and return to [facility] [address]  [address]  [place], [address]  [phone] fax

## 2020-08-26 NOTE — PROGRESS NOTES
PT DAILY TREATMENT NOTE 10-18    Patient Name: Aniket Felix  Date:2020  : 1960  [x]  Patient  Verified  Payor: BLUE CROSS / Plan: [employer] [facility] / Product Type: PPO /    In time:2:30  Out time:3:15  Total Treatment Time (min): 45  Visit #: 1 of 8    Medicare/BCBS Only   Total Timed Codes (min):  25 1:1 Treatment Time:  25       Treatment Area: Low back pain [M54.5]    SUBJECTIVE  Pain Level (0-10 scale): 8/10  Any medication changes, allergies to medications, adverse drug reactions, diagnosis change, or new procedure performed?: [x] No    [] Yes (see summary sheet for update)  Subjective functional status/changes:   [] No changes reported  The patient has a chief complaint of low back pain and left quad soreness. OBJECTIVE  15 min Therapeutic Exercise:  [x] See flow sheet :   Rationale: increase ROM and increase strength to improve the patients ability to improve ADl ease. 10 min Manual Therapy:  Stick to left quad in right sidelying, STM vastus lateralis. Rationale: decrease pain, increase ROM and increase tissue extensibility to improve ADL ease. With   [] TE   [] TA   [] neuro   [] other: Patient Education: [x] Review HEP    [] Progressed/Changed HEP based on:   [] positioning   [] body mechanics   [] transfers   [] heat/ice application    [] other:      Other Objective/Functional Measures:   Discussed with the patient the option of working on her quad pain with manual and revisit the option of DN in a few weeks. The patient agreed to this plan stating that she would rather see if she could get rid of her pain without it. Pain Level (0-10 scale) post treatment: 5/10    ASSESSMENT/Changes in Function: See POC.      Patient will continue to benefit from skilled PT services to modify and progress therapeutic interventions, address functional mobility deficits, address ROM deficits, address strength deficits, analyze and address soft tissue restrictions, analyze and cue movement patterns, analyze and modify body mechanics/ergonomics, assess and modify postural abnormalities and instruct in home and community integration to attain remaining goals. []  See Plan of Care  []  See progress note/recertification  []  See Discharge Summary         Progress towards goals / Updated goals:  Short Term Goals: To be accomplished in 2 weeks:               1. The patient will demonstrate independence and compliance with HEP to maximize ADL ease. IE: issued HEP. 2. The patient will improve sit to stand without quad pain to improve ease of transfers. IE: pain with transfers. Long Term Goals: To be accomplished in 4 weeks:               1. The patient will improve FOTO score to 48 to maximize quality of life. IE: 50               2. The patient will perform chores and housework with moderate difficulty to improve ease of chore production. IE: quite a bit of difficulty               3. The patient will improve hip ABD/EXT to 4+/5 MMT to improve stability in stance. IE: 4-/5 MMT               4. The patient will report being able to sleep through the night without awakening due to pain to improve ease of ADL efficiency. IE: frequently awakens with pain.       PLAN  []  Upgrade activities as tolerated     [x]  Continue plan of care  []  Update interventions per flow sheet       []  Discharge due to:_  []  Other:_      Ra Daniel, PT 8/26/2020  3:21 PM    Future Appointments   Date Time Provider Colleen Red   8/28/2020  7:45 AM Reji Scott, PTA [facility]   8/31/2020  7:45 AM Faith Skaggs, PT [facility]   9/4/2020  8:30 AM Alexis Males, PT [facility]   9/8/2020  8:30 AM Aron Cerda, PTA [facility]   9/11/2020  7:45 AM Alexis Males, PT [facility]   9/15/2020  7:00 AM Aron Cerda, PTA [facility]   9/18/2020  7:00 AM Yaneli Watts, PT [facility]

## 2020-08-28 ENCOUNTER — HOSPITAL ENCOUNTER (OUTPATIENT)
Dept: PHYSICAL THERAPY | Age: 60
Discharge: HOME | End: 2020-08-28
Payer: COMMERCIAL

## 2020-08-28 PROCEDURE — 97110 THERAPEUTIC EXERCISES: CPT

## 2020-08-28 NOTE — PROGRESS NOTES
PT DAILY TREATMENT NOTE 10-18    Patient Name: Hussein   Date:2020  : 1960  [x]  Patient  Verified  Payor: BLUE CROSS / Plan: [employer] [facility] / Product Type: PPO /    In time:7:46  Out time:8:30  Total Treatment Time (min): 44  Visit #: 2 of 8    Medicare/BCBS Only   Total Timed Codes (min):  34 1:1 Treatment Time:  34       Treatment Area: Low back pain [M54.5]    SUBJECTIVE  Pain Level (0-10 scale): -8/10  Any medication changes, allergies to medications, adverse drug reactions, diagnosis change, or new procedure performed?: [x] No    [] Yes (see summary sheet for update)  Subjective functional status/changes:   [] No changes reported  \"I didn't sleep well last night, I have off nights at times. \"    OBJECTIVE    Modality rationale: decrease inflammation and decrease pain to improve the patients ability to perform ADL's.    Min Type Additional Details    [] Estim:  []Unatt       []IFC  []Premod                        []Other:  []w/ice   []w/heat  Position:  Location:    [] Estim: []Att    []TENS instruct  []NMES                    []Other:  []w/US   []w/ice   []w/heat  Position:  Location:    []  Traction: [] Cervical       []Lumbar                       [] Prone          []Supine                       []Intermittent   []Continuous Lbs:  [] before manual  [] after manual    []  Ultrasound: []Continuous   [] Pulsed                           []1MHz   []3MHz W/cm2:  Location:    []  Iontophoresis with dexamethasone         Location: [] Take home patch   [] In clinic   10 [x]  Ice     []  heat  []  Ice massage  []  Laser   []  Anodyne Position: Seated  Location: L/S    []  Laser with stim  []  Other:  Position:  Location:    []  Vasopneumatic Device Pressure:       [] lo [] med [] hi   Temperature: [] lo [] med [] hi   [] Skin assessment post-treatment:  []intact []redness- no adverse reaction    []redness  adverse reaction:     34 min Therapeutic Exercise:  [x] See flow sheet : Rationale: increase ROM, increase strength and increase proprioception to improve the patients ability to perform ADL's. With   [x] TE   [] TA   [] neuro   [] other: Patient Education: [x] Review HEP    [] Progressed/Changed HEP based on:   [] positioning   [] body mechanics   [] transfers   [] heat/ice application    [] other:      Other Objective/Functional Measures: Initiated exercises per flow sheet. Pain Level (0-10 scale) post treatment: 5/10    ASSESSMENT/Changes in Function: First F/U visit. Pt fully participated in treatment. Reported a decrease in pain level post-treatment. Patient will continue to benefit from skilled PT services to modify and progress therapeutic interventions, address functional mobility deficits, address ROM deficits, address strength deficits, analyze and address soft tissue restrictions and analyze and modify body mechanics/ergonomics to attain remaining goals. [x]  See Plan of Care  []  See progress note/recertification  []  See Discharge Summary         Progress towards goals / Updated goals:  Short Term Goals: To be accomplished in 2 weeks:               1. The patient will demonstrate independence and compliance with HEP to maximize ADL ease. IE: issued HEP. Current: Met, pt reports HEP compliance. 8/28/2020               2. The patient will improve sit to stand without quad pain to improve ease of transfers. IE: pain with transfers. Long Term Goals: To be accomplished in 4 weeks:               1. The patient will improve FOTO score to 48 to maximize quality of life. IE: 50               2. The patient will perform chores and housework with moderate difficulty to improve ease of chore production. IE: quite a bit of difficulty               3. The patient will improve hip ABD/EXT to 4+/5 MMT to improve stability in stance. IE: 4-/5 MMT               4.  The patient will report being able to sleep through the night without awakening due to pain to improve ease of ADL efficiency.                IE: frequently awakens with pain.     PLAN  []  Upgrade activities as tolerated     [x]  Continue plan of care  []  Update interventions per flow sheet       []  Discharge due to:_  []  Other:_      Aman Mayes, PTA 8/28/2020  7:53 AM    Future Appointments   Date Time Provider Colleen Red   8/31/2020  7:45 AM Humza Burns, PT [facility]   9/4/2020  8:30 AM Ashley Wild, PT [facility]   9/8/2020  8:30 AM Med Spangler, PTA [facility]   9/11/2020  7:45 AM Ashley Wild, PT [facility]   9/15/2020  7:00 AM Med Spangler, PTA [facility]   9/18/2020  7:00 AM Telma Watts, PT [facility]

## 2020-08-31 ENCOUNTER — HOSPITAL ENCOUNTER (OUTPATIENT)
Dept: PHYSICAL THERAPY | Age: 60
Discharge: HOME | End: 2020-08-31
Payer: COMMERCIAL

## 2020-08-31 PROCEDURE — 97140 MANUAL THERAPY 1/> REGIONS: CPT

## 2020-08-31 PROCEDURE — 97110 THERAPEUTIC EXERCISES: CPT

## 2020-08-31 NOTE — PROGRESS NOTES
PT DAILY TREATMENT NOTE 10-18    Patient Name: Harshad Mejia  Date:2020  : 1960  [x]  Patient  Verified  Payor: BLUE CROSS / Plan: [employer] [facility] / Product Type: PPO /    In time:746  Out time:839  Total Treatment Time (min): 53  Visit #: 3 of 8    Medicare/BCBS Only   Total Timed Codes (min):  43 1:1 Treatment Time:  53       Treatment Area: Low back pain [M54.5]    SUBJECTIVE  Pain Level (0-10 scale): 6  Any medication changes, allergies to medications, adverse drug reactions, diagnosis change, or new procedure performed?: [x] No    [] Yes (see summary sheet for update)  Subjective functional status/changes:   [] No changes reported  Reports continued pain with STS in the low back/buttocks rated at 4/10. OBJECTIVE    Modality rationale: decrease inflammation and decrease pain to improve the patients ability to tolerate ADLs. Min Type Additional Details   10 [x]  Ice     []  heat  []  Ice massage  []  Laser   []  Anodyne Position: seated  Location: lumbar/sacral spine   [] Skin assessment post-treatment:  []intact []redness- no adverse reaction    []redness  adverse reaction:     35 min Therapeutic Exercise:  [x] See flow sheet : exercises initiated per flowsheet   Rationale: increase strength, improve coordination and increase proprioception to improve the patients ability to tolerate ADLs and self care. 8 min Manual Therapy:  Prone -- STM lumbar erectors and piriformis B, DTM right QL   Rationale: decrease pain and increase tissue extensibility to improve ease and comfort of ADLs.            With   [] TE   [] TA   [] neuro   [] other: Patient Education: [x] Review HEP    [] Progressed/Changed HEP based on:   [] positioning   [] body mechanics   [] transfers   [] heat/ice application    [] other:      Other Objective/Functional Measures: pain remains with STS      Pain Level (0-10 scale) post treatment: 5    ASSESSMENT/Changes in Function: Pt educated in self quadricep stretching at home utilizing a strap in supine or prone. Will incorporate ely stretching into next visit. Pt forgot brace today but maintains precautions with transfers and bed mobility. Performs all exercises as directed with cueing to contract the abdominal muscles. Continue to progress as able. Patient will continue to benefit from skilled PT services to modify and progress therapeutic interventions, address functional mobility deficits, address ROM deficits, address strength deficits, analyze and address soft tissue restrictions, analyze and cue movement patterns, analyze and modify body mechanics/ergonomics, assess and modify postural abnormalities and instruct in home and community integration to attain remaining goals. [x]  See Plan of Care  []  See progress note/recertification  []  See Discharge Summary         Progress towards goals / Updated goals:  Short Term Goals: To be accomplished in 2 weeks:  1. The patient will demonstrate independence and compliance with HEP to maximize ADL ease.              IE: issued HEP. Current: Met, pt reports HEP compliance. 8/28/2020  2. The patient will improve sit to stand without quad pain to improve ease of transfers.              IE: pain with transfers. Current: remains, pain in the low back/B piriformis and quads 4/10 with sit to stands (8/31/2020)  Long Term Goals: To be accomplished in 4 weeks:  1. The patient will improve FOTO score to 48 to maximize quality of life.              IE: 48  2. The patient will perform chores and housework with moderate difficulty to improve ease of chore production.               BP: quite a bit of difficulty  3. The patient will improve hip ABD/EXT to 4+/5 MMT to improve stability in stance.              IE: 4-/5 MMT               4. The patient will report being able to sleep through the night without awakening due to pain to improve ease of ADL efficiency.                 IE: frequently awakens with pain.    PLAN  []  Upgrade activities as tolerated     [x]  Continue plan of care  []  Update interventions per flow sheet       []  Discharge due to:_  []  Other:_      Kayce Montiel, PT 8/31/2020  8:02 AM    Future Appointments   Date Time Provider Colleen Red   9/4/2020  8:30 AM Brown Raymond, PT [facility]   9/8/2020  8:30 AM Haley Quiles, PTA [facility]   9/11/2020  7:45 AM Brown Raymond, PT [facility]   9/15/2020  7:00 AM Haley Quiles, PTA [facility]   9/18/2020  7:00 AM Selvin Watts, PT [facility]

## 2020-09-04 ENCOUNTER — HOSPITAL ENCOUNTER (OUTPATIENT)
Dept: PHYSICAL THERAPY | Age: 60
Discharge: HOME | End: 2020-09-04
Payer: COMMERCIAL

## 2020-09-04 PROCEDURE — 97140 MANUAL THERAPY 1/> REGIONS: CPT

## 2020-09-04 PROCEDURE — 97110 THERAPEUTIC EXERCISES: CPT

## 2020-09-04 NOTE — PROGRESS NOTES
PT DAILY TREATMENT NOTE 10-18    Patient Name: Kelsi De Los Santos  Date:2020  : 1960  [x]  Patient  Verified  Payor: BLUE CROSS / Plan:  [facility] / Product Type: PPO /    In time:829  Out time:920  Total Treatment Time (min): 51  Visit #: 4 of 8    Medicare/BCBS Only   Total Timed Codes (min):  41 1:1 Treatment Time:  41       Treatment Area: Low back pain [M54.5]    SUBJECTIVE  Pain Level (0-10 scale): 5  Any medication changes, allergies to medications, adverse drug reactions, diagnosis change, or new procedure performed?: [x] No    [] Yes (see summary sheet for update)  Subjective functional status/changes:   [] No changes reported  Pt with c/o right lumbar and glute region pain. She reports some relief with HEP but has pain remains.      OBJECTIVE    Modality rationale: decrease inflammation and decrease pain to improve the patients ability to perform ADL   Min Type Additional Details    [] Estim:  []Unatt       []IFC  []Premod                        []Other:  []w/ice   []w/heat  Position:  Location:    [] Estim: []Att    []TENS instruct  []NMES                    []Other:  []w/US   []w/ice   []w/heat  Position:  Location:    []  Traction: [] Cervical       []Lumbar                       [] Prone          []Supine                       []Intermittent   []Continuous Lbs:  [] before manual  [] after manual    []  Ultrasound: []Continuous   [] Pulsed                           []1MHz   []3MHz W/cm2:  Location:    []  Iontophoresis with dexamethasone         Location: [] Take home patch   [] In clinic   10 [x]  Ice     []  heat  []  Ice massage  []  Laser   []  Anodyne Position:seated  Location:lumbar    []  Laser with stim  []  Other:  Position:  Location:    []  Vasopneumatic Device Pressure:       [] lo [] med [] hi   Temperature: [] lo [] med [] hi   [] Skin assessment post-treatment:  []intact []redness- no adverse reaction    []redness  adverse reaction:         33 min Therapeutic Exercise:  [x] See flow sheet :   Rationale: increase ROM and increase strength to improve the patients ability to perform ADL         8 min Manual Therapy:  STM Right lumbar paraspinals, Myofascial stick to glutes/piriformis   Rationale: decrease pain and increase tissue extensibility to improve ability for functional tasks. With   [] TE   [] TA   [] neuro   [] other: Patient Education: [x] Review HEP    [] Progressed/Changed HEP based on:   [] positioning   [] body mechanics   [] transfers   [] heat/ice application    [] other:      Other Objective/Functional Measures:  No changes to therex     Pain Level (0-10 scale) post treatment: 4    ASSESSMENT/Changes in Function:  The pt reports pain in the right lumbar and gluteal regions. She is able to complete therex without c/o increase in pain. Decreased pain reported post treatment. Patient will continue to benefit from skilled PT services to modify and progress therapeutic interventions, address functional mobility deficits, address ROM deficits, address strength deficits, analyze and address soft tissue restrictions, analyze and cue movement patterns, analyze and modify body mechanics/ergonomics and assess and modify postural abnormalities to attain remaining goals. []  See Plan of Care  []  See progress note/recertification  []  See Discharge Summary           Progress towards goals / Updated goals:  Short Term Goals: To be accomplished in 2 weeks:  1. The patient will demonstrate independence and compliance with HEP to maximize ADL ease.              IE: issued HEP.             [id number]: Met, pt reports HEP compliance. 8/28/2020  2. The patient will improve sit to stand without quad pain to improve ease of transfers.              IE: pain with transfers. Current: remains, pain in the low back/B piriformis and quads 4/10 with sit to stands (8/31/2020)  Long Term Goals: To be accomplished in 4 weeks:  1.  The patient will improve FOTO score to 48 to maximize quality of life.              IE: 48  2. The patient will perform chores and housework with moderate difficulty to improve ease of chore production.               AO: quite a bit of difficulty  3. The patient will improve hip ABD/EXT to 4+/5 MMT to improve stability in stance.              IE: 4-/5 MMT               4. The patient will report being able to sleep through the night without awakening due to pain to improve ease of ADL efficiency.                 IE: frequently awakens    Current: no change 9-4-20    PLAN  []  Upgrade activities as tolerated     [x]  Continue plan of care  []  Update interventions per flow sheet       []  Discharge due to:_  []  Other:_      Zoey Villalta, PT 9/4/2020  8:49 AM    Future Appointments   Date Time Provider Colleen Red   9/8/2020  8:30 AM Janice Myers PTA [facility]   9/11/2020  7:45 AM Mira Rievra, PT [facility]   9/15/2020  7:00 AM Janice Myers PTA [facility]   9/18/2020  7:00 AM Jason Watts, PT [facility]

## 2020-09-08 ENCOUNTER — HOSPITAL ENCOUNTER (OUTPATIENT)
Dept: PHYSICAL THERAPY | Age: 60
Discharge: HOME | End: 2020-09-08
Payer: COMMERCIAL

## 2020-09-08 PROCEDURE — 97140 MANUAL THERAPY 1/> REGIONS: CPT

## 2020-09-08 PROCEDURE — 97110 THERAPEUTIC EXERCISES: CPT

## 2020-09-08 NOTE — PROGRESS NOTES
PT DAILY TREATMENT NOTE 10-18    Patient Name: Dileep Newberry  Date:2020  : 1960  [x]  Patient  Verified  Payor: BLUE CROSS / Plan: [employer] [facility] [employer] / Product Type: PPO /    In time:8:32  Out time:915  Total Treatment Time (min): 43  Visit #: 5 of 8    Medicare/BCBS Only   Total Timed Codes (min):  33 1:1 Treatment Time:  33       Treatment Area: Low back pain [M54.5]    SUBJECTIVE  Pain Level (0-10 scale): 8/10  Any medication changes, allergies to medications, adverse drug reactions, diagnosis change, or new procedure performed?: [x] No    [] Yes (see summary sheet for update)  Subjective functional status/changes:   [] No changes reported  Pt reports compliance with HEP. Pt reports increased pain and difficulty sleeping last night. OBJECTIVE    Modality rationale: decrease inflammation and decrease pain to improve the patients ability tolerate ADLs.     Min Type Additional Details    [] Estim:  []Unatt       []IFC  []Premod                        []Other:  []w/ice   []w/heat  Position:  Location:    [] Estim: []Att    []TENS instruct  []NMES                    []Other:  []w/US   []w/ice   []w/heat  Position:  Location:    []  Traction: [] Cervical       []Lumbar                       [] Prone          []Supine                       []Intermittent   []Continuous Lbs:  [] before manual  [] after manual    []  Ultrasound: []Continuous   [] Pulsed                           []1MHz   []3MHz W/cm2:  Location:    []  Iontophoresis with dexamethasone         Location: [] Take home patch   [] In clinic   10 [x]  Ice     []  heat  []  Ice massage  []  Laser   []  Anodyne Position: sitting  Location:right hip    []  Laser with stim  []  Other:  Position:  Location:    []  Vasopneumatic Device Pressure:       [] lo [] med [] hi   Temperature: [] lo [] med [] hi   [] Skin assessment post-treatment:  []intact []redness- no adverse reaction    []redness  adverse reaction:     25 min Therapeutic Exercise:  [x] See flow sheet :   Rationale: increase ROM and increase strength to improve the patients ability to tolerate ADLs. 8 min Manual Therapy:  Stick to right glute/piriformis/quad   Rationale: decrease pain, increase ROM and increase tissue extensibility to improve tolerance to ADLs. With   [] TE   [] TA   [] neuro   [] other: Patient Education: [x] Review HEP    [] Progressed/Changed HEP based on:   [] positioning   [] body mechanics   [] transfers   [] heat/ice application    [] other:      Other Objective/Functional Measures: Held exercises as per flow sheet due to patient having reports of increased pain. Pain Level (0-10 scale) post treatment: 6/10    ASSESSMENT/Changes in Function: Pt demonstrates slight decrease in symptoms post manual and therapeutic exercises. Patient will continue to benefit from skilled PT services to modify and progress therapeutic interventions, address functional mobility deficits, address ROM deficits, address strength deficits, analyze and address soft tissue restrictions, analyze and cue movement patterns and analyze and modify body mechanics/ergonomics to attain remaining goals. []  See Plan of Care  []  See progress note/recertification  []  See Discharge Summary         Progress towards goals / Updated goals:  Short Term Goals: To be accomplished in 2 weeks:  1. The patient will demonstrate independence and compliance with HEP to maximize ADL ease.              IE: issued HEP.             YRJMZGH: Met, pt reports HEP compliance. 8/28/2020  2. The patient will improve sit to stand without quad pain to improve ease of transfers.              IE: pain with transfers.              Current: remains, pain in the low back/B piriformis and quads 4/10 with sit to stands (8/31/2020)  Long Term Goals: To be accomplished in 4 weeks:  1. The patient will improve FOTO score to 48 to maximize quality of life.              IE: 48  2.  The patient will perform chores and housework with moderate difficulty to improve ease of chore production.               TI: quite a bit of difficulty  3. The patient will improve hip ABD/EXT to 4+/5 MMT to improve stability in stance.              IE: 4-/5 MMT               4. The patient will report being able to sleep through the night without awakening due to pain to improve ease of ADL efficiency.                 IE: frequently awakens               Current: no change 9-4-20    PLAN  []  Upgrade activities as tolerated     [x]  Continue plan of care  []  Update interventions per flow sheet       []  Discharge due to:_  []  Other:_      Nancy Nova, JOHN 9/8/2020  8:38 AM    Future Appointments   Date Time Provider Colleen Red   9/11/2020  7:45 AM Jenifer aClero, PT [facility]   9/15/2020  7:00 AM Media Seen, PTA [facility]   9/18/2020  7:00 AM Tyrell Watts, PT [facility]

## 2020-09-11 ENCOUNTER — HOSPITAL ENCOUNTER (OUTPATIENT)
Dept: PHYSICAL THERAPY | Age: 60
Discharge: HOME | End: 2020-09-11
Payer: COMMERCIAL

## 2020-09-11 PROCEDURE — 97140 MANUAL THERAPY 1/> REGIONS: CPT

## 2020-09-11 PROCEDURE — 97110 THERAPEUTIC EXERCISES: CPT

## 2020-09-11 NOTE — PROGRESS NOTES
PT DAILY TREATMENT NOTE 10-18    Patient Name: Harshad Mejia  Date:2020  : 1960  [x]  Patient  Verified  Payor: BLUE CROSS / [facility] [employer] [facility] / Product Type: PPO /    In ASXJ:[id number]  Out time:8:34  Total Treatment Time (min): 49  Visit #: 6 of 8    Medicare/BCBS Only   Total Timed Codes (min):  39 1:1 Treatment Time:  39       Treatment Area: Low back pain [M54.5]    SUBJECTIVE  Pain Level (0-10 scale): 5  Any medication changes, allergies to medications, adverse drug reactions, diagnosis change, or new procedure performed?: [x] No    [] Yes (see summary sheet for update)  Subjective functional status/changes:   [] No changes reported  The pt reports her pain is not too bad today.      OBJECTIVE    Modality rationale: decrease inflammation and decrease pain to improve the patients ability to perform ADL   Min Type Additional Details    [] Estim:  []Unatt       []IFC  []Premod                        []Other:  []w/ice   []w/heat  Position:  Location:    [] Estim: []Att    []TENS instruct  []NMES                    []Other:  []w/US   []w/ice   []w/heat  Position:  Location:    []  Traction: [] Cervical       []Lumbar                       [] Prone          []Supine                       []Intermittent   []Continuous Lbs:  [] before manual  [] after manual    []  Ultrasound: []Continuous   [] Pulsed                           []1MHz   []3MHz W/cm2:  Location:    []  Iontophoresis with dexamethasone         Location: [] Take home patch   [] In clinic   10 [x]  Ice     []  heat  []  Ice massage  []  Laser   []  Anodyne Position: seated  Location: lumbar    []  Laser with stim  []  Other:  Position:  Location:    []  Vasopneumatic Device Pressure:       [] lo [] med [] hi   Temperature: [] lo [] med [] hi   [] Skin assessment post-treatment:  []intact []redness- no adverse reaction    []redness  adverse reaction:       29 min Therapeutic Exercise:  [x] See flow sheet :   Rationale: increase ROM and increase strength to improve the patients ability to perform ADL      10 min Manual Therapy:  STM right lumbar paraspinals, myofascial stick to the right glute max/piriformis   Rationale: decrease pain and increase tissue extensibility to improve functional mobility             With   [] TE   [] TA   [] neuro   [] other: Patient Education: [x] Review HEP    [] Progressed/Changed HEP based on:   [] positioning   [] body mechanics   [] transfers   [] heat/ice application    [] other:      Other Objective/Functional Measures:       Pain Level (0-10 scale) post treatment: 2    ASSESSMENT/Changes in Function:  Decreased pain noted today with improved tolerance to therex. Improved pain level post treatment as well. Patient will continue to benefit from skilled PT services to modify and progress therapeutic interventions, address functional mobility deficits, address ROM deficits, address strength deficits, analyze and address soft tissue restrictions, analyze and cue movement patterns and analyze and modify body mechanics/ergonomics to attain remaining goals. []  See Plan of Care  []  See progress note/recertification  []  See Discharge Summary         Progress towards goals / Updated goals:  Short Term Goals: To be accomplished in 2 weeks:  1. The patient will demonstrate independence and compliance with HEP to maximize ADL ease.              IE: issued HEP.             UCZAOCD: Met, pt reports HEP compliance. 8/28/2020  2. The patient will improve sit to stand without quad pain to improve ease of transfers.              IE: pain with transfers.              Current: pain has been variable but present 9-11-20  Long Term Goals: To be accomplished in 4 weeks:  1. The patient will improve FOTO score to 48 to maximize quality of life.              IE: 48  2.  The patient will perform chores and housework with moderate difficulty to improve ease of chore production.               IE: quite a bit of difficulty  3. The patient will improve hip ABD/EXT to 4+/5 MMT to improve stability in stance.              IE: 4-/5 MMT               4. The patient will report being able to sleep through the night without awakening due to pain to improve ease of ADL efficiency.                 IE: frequently awakens               DYDGRYC: no change 9-4-20    PLAN  []  Upgrade activities as tolerated     [x]  Continue plan of care  []  Update interventions per flow sheet       []  Discharge due to:_  []  Other:_      Neda Benson, PHILIPPE 9/11/2020  8:05 AM    Future Appointments   Date Time Provider Colleen Red   9/15/2020  7:00 [facility]   9/18/2020  7:00 AM [facility]isson [facility]

## 2020-09-15 ENCOUNTER — HOSPITAL ENCOUNTER (OUTPATIENT)
Dept: PHYSICAL THERAPY | Age: 60
Discharge: HOME | End: 2020-09-15
Payer: COMMERCIAL

## 2020-09-15 PROCEDURE — 97140 MANUAL THERAPY 1/> REGIONS: CPT

## 2020-09-15 PROCEDURE — 97110 THERAPEUTIC EXERCISES: CPT

## 2020-09-15 NOTE — PROGRESS NOTES
PT DAILY TREATMENT NOTE 10-18    Patient Name: Louis Benavides  Date:9/15/2020  : 1960  [x]  Patient  Verified  Payor: Sarah Katlyn / Plan:  [facility] [place] / Product Type: PPO /    In time:7:00  Out time:7:34  Total Treatment Time (min): 34  Visit #: 7 of 8    Medicare/BCBS Only   Total Timed Codes (min):  34 1:1 Treatment Time:  34       Treatment Area: Low back pain [M54.5]    SUBJECTIVE  Pain Level (0-10 scale): 6/10  Any medication changes, allergies to medications, adverse drug reactions, diagnosis change, or new procedure performed?: [x] No    [] Yes (see summary sheet for update)  Subjective functional status/changes:   [] No changes reported  Pt reports stiffness and pain on her left side today. OBJECTIVE    24 min Therapeutic Exercise:  [x] See flow sheet :   Rationale: increase ROM and increase strength to improve the patients ability to perform ADLs. 10 min Manual Therapy:  STM to bilateral lumbar paraspinals, left QL, left glute. Stick to left glute with patient in prone. Rationale: decrease pain, increase ROM and increase tissue extensibility to improve functional mobility. With   [] TE   [] TA   [] neuro   [] other: Patient Education: [x] Review HEP    [] Progressed/Changed HEP based on:   [] positioning   [] body mechanics   [] transfers   [] heat/ice application    [] other:      Other Objective/Functional Measures:      Pain Level (0-10 scale) post treatment: 4/10    ASSESSMENT/Changes in Function: Pt demonstrates continued forward flexed posture when standing and ambulating. Pt has decreased symptoms and complaints of pain post treatment.      Patient will continue to benefit from skilled PT services to modify and progress therapeutic interventions, address functional mobility deficits, address ROM deficits, address strength deficits, analyze and address soft tissue restrictions, analyze and cue movement patterns and analyze and modify body mechanics/ergonomics to attain remaining goals. []  See Plan of Care  []  See progress note/recertification  []  See Discharge Summary         Progress towards goals / Updated goals:  Short Term Goals: To be accomplished in 2 weeks:  1. The patient will demonstrate independence and compliance with HEP to maximize ADL ease.              IE: issued HEP.             AQKCHXH: Met, pt reports HEP compliance. 8/28/2020  2. The patient will improve sit to stand without quad pain to improve ease of transfers.              IE: pain with transfers.              Current: pain has been variable but present 9-11-20  Long Term Goals: To be accomplished in 4 weeks:  1. The patient will improve FOTO score to 48 to maximize quality of life.              IE: 48  2. The patient will perform chores and housework with moderate difficulty to improve ease of chore production.               OH: quite a bit of difficulty    Current: pt reports increased pain and stiffness due to cleaning over the weekend. 9/15/2020  3. The patient will improve hip ABD/EXT to 4+/5 MMT to improve stability in stance.              IE: 4-/5 MMT               4. The patient will report being able to sleep through the night without awakening due to pain to improve ease of ADL efficiency.                 WF: frequently awakens               JHXMPYE: no change 9-4-20     PLAN  []  Upgrade activities as tolerated     [x]  Continue plan of care  []  Update interventions per flow sheet       []  Discharge due to:_  []  Other:_      Phyllistine July, PTA 9/15/2020  7:11 AM    Future Appointments   Date Time Provider Colleen Red   9/18/2020  7:00 AM Blanche GONZALEZ, PT [facility]

## 2020-09-18 ENCOUNTER — HOSPITAL ENCOUNTER (OUTPATIENT)
Dept: PHYSICAL THERAPY | Age: 60
Discharge: HOME | End: 2020-09-18
Payer: COMMERCIAL

## 2020-09-18 PROCEDURE — 97140 MANUAL THERAPY 1/> REGIONS: CPT

## 2020-09-18 PROCEDURE — 97110 THERAPEUTIC EXERCISES: CPT

## 2020-09-18 PROCEDURE — 97112 NEUROMUSCULAR REEDUCATION: CPT

## 2020-09-18 NOTE — PROGRESS NOTES
[facility]  [address]  [place], [address]  [phone] fax    Physical Therapy Progress Note  Patient name: Albino Pollack Start of Care: 2020   Referral source: Domo Stanley MD : 1960                Medical Diagnosis: Low back pain [M54.5]  Payor: Clemente Goodpasture / Plan: [facility] [place] / Product Type: PPO /  Onset Date:7/10/2020                Treatment Diagnosis: LBP   Prior Hospitalization: see medical history Provider#: [id number]   Medications: Verified on Patient summary List    Comorbidities: Arthritis, Depression, HTN, Lumbar fusion, hysterectomy and gall bladder removal.   Prior Level of Function: The patient reports that she had pain with all activity, walking and chore production prior to onset. Visits from Start of Care: 8    Missed Visits: 0    Key Functional Changes:   Short Term Goals: To be accomplished in 2 weeks:  1. The patient will demonstrate independence and compliance with HEP to maximize ADL ease.              IE: issued HEP.             DWAYNE: Met, pt reports HEP compliance. 2020  2. The patient will improve sit to stand without quad pain to improve ease of transfers.              IE: pain with transfers.              Current: pain has been variable but present 20  Long Term Goals: To be accomplished in 4 weeks:  1. The patient will improve FOTO score to 48 to maximize quality of life.              IE: 48              Current: 51 meeting goal  2. The patient will perform chores and housework with moderate difficulty to improve ease of chore production.               : quite a bit of difficulty               JJLTGCV: pt reports increased pain and stiffness due to cleaning over the weekend. 9/15/2020  3. The patient will improve hip ABD/EXT to 4+/5 MMT to improve stability in stance.              IE: 4-/5 MMT              Current: 4/5 MMT 2020 B               4.  The patient will report being able to sleep through the night without awakening due to pain to improve ease of ADL efficiency.                 IE: frequently awakens               MWGOBFJ: Improvement, reported sleeping well last night 9/18/2020      Updated Goals: to be achieved in 4 weeks:   1. The patient will perform chores and housework with moderate difficulty to improve ease of chore production.               VQ pt reports increased pain and stiffness due to cleaning over the weekend. 2. The patient will improve hip ABD/EXT to 4+/5 MMT to improve stability in stance.              PN: 4/5 MMT 9/18/2020 B               4. The patient will report being able to sleep through the night without awakening due to pain to improve ease of ADL efficiency.                 PN: improvement, reported sleeping well last night, but pain most nights. ASSESSMENT/RECOMMENDATIONS: The patient has made progress regarding a decrease in quad pain, improvements in hip strength, and a FOTO score that is now 51 indicating improved functional activity ease. She states that she did sleep better last night with better quality sleep. She will continue to benefit from PT to progress strengthening which was initiated today.      [x]Continue therapy per initial plan/protocol at a frequency of  2 x per week for 4 weeks  []Continue therapy with the following recommended changes:_____________________      _____________________________________________________________________  []Discontinue therapy progressing towards or have reached established goals  []Discontinue therapy due to lack of appreciable progress towards goals  []Discontinue therapy due to lack of attendance or compliance  []Await Physician's recommendations/decisions regarding therapy  []Other:________________________________________________________________    Thank you for this referral.    Juan Escobar, PT 9/18/2020 7:43 AM  NOTE TO PHYSICIAN:  PLEASE COMPLETE THE ORDERS BELOW AND   [facility]: [phone]  If you are unable to process this request in 24 hours please contact our office: [phone]    ? I have read the above report and request that my patient continue as recommended. ? I have read the above report and request that my patient continue therapy with the following changes/special instructions:__________________________________________________________  ? I have read the above report and request that my patient be discharged from therapy.     Physicians signature: ______________________________Date: ______Time:______

## 2020-09-18 NOTE — PROGRESS NOTES
PT DAILY TREATMENT NOTE 10-18    Patient Name: Scott Jha  Date:2020  : 1960  [x]  Patient  Verified  Payor: BLUE CROSS / [facility] / Product Type: PPO /    In time:7:00  Out time:7:41  Total Treatment Time (min): 41  Visit #: 8 of 8    Medicare/BCBS Only   Total Timed Codes (min):  41 1:1 Treatment Time:  41        Treatment Area: Low back pain [M54.5]    SUBJECTIVE  Pain Level (0-10 scale): 3/10  Any medication changes, allergies to medications, adverse drug reactions, diagnosis change, or new procedure performed?: [x] No    [] Yes (see summary sheet for update)  Subjective functional status/changes:   [] No changes reported  The patient states her leg pain has gotten better and she did sleep better last night. OBJECTIVE  Modality rationale: The patient declines modalities today. Min Type Additional Details   [] Skin assessment post-treatment:  []intact []redness- no adverse reaction    []redness  adverse reaction:     21 min Therapeutic Exercise:  [x] See flow sheet :   Rationale: increase ROM and increase strength to improve the patients ability to improve ADL ease. 12 min Neuromuscular Re-education:  []  See flow sheet :   Rationale: increase ROM and increase strength  to improve the patients ability to improve ADL ease. 8 min Manual Therapy:  Lumbar spine MFR with scar mobs and massage with the patient in prone, pillow beneath hips. Rationale: decrease pain, increase ROM and increase tissue extensibility to improve ADL ease. With   [] TE   [] TA   [] neuro   [] other: Patient Education: [x] Review HEP    [] Progressed/Changed HEP based on:   [] positioning   [] body mechanics   [] transfers   [] heat/ice application    [] other:      Other Objective/Functional Measures:   Able to progress to 2# ankle weights today, noting an improvement in hip strength.     Hip extension: 4/5 MMT    FOTO: 51    Pain Level (0-10 scale) post treatment: 2/10    ASSESSMENT/Changes in Function: The patient has made progress regarding a decrease in quad pain, improvements in hip strength, and a FOTO score that is now 51 indicating improved functional activity ease. She states that she did sleep better last night with better quality sleep. She will continue to benefit from PT to progress strengthening which was initiated today. Patient will continue to benefit from skilled PT services to modify and progress therapeutic interventions, address functional mobility deficits, address ROM deficits, address strength deficits, analyze and address soft tissue restrictions, analyze and cue movement patterns, analyze and modify body mechanics/ergonomics, assess and modify postural abnormalities and instruct in home and community integration to attain remaining goals. []  See Plan of Care  []  See progress note/recertification  []  See Discharge Summary         Progress towards goals / Updated goals:  Short Term Goals: To be accomplished in 2 weeks:  1. The patient will demonstrate independence and compliance with HEP to maximize ADL ease.              IE: issued HEP.             KIRT: Met, pt reports HEP compliance. 8/28/2020  2. The patient will improve sit to stand without quad pain to improve ease of transfers.              IE: pain with transfers.              Current: pain has been variable but present 9-11-20  Long Term Goals: To be accomplished in 4 weeks:  1. The patient will improve FOTO score to 48 to maximize quality of life.              IE: 48   Current: 51 meeting goal  2. The patient will perform chores and housework with moderate difficulty to improve ease of chore production.               VO: quite a bit of difficulty               Current: pt reports increased pain and stiffness due to cleaning over the weekend. 9/15/2020  3.  The patient will improve hip ABD/EXT to 4+/5 MMT to improve stability in stance.              IE: 4-/5 MMT   Current: 4/5 MMT 9/18/2020 B               4. The patient will report being able to sleep through the night without awakening due to pain to improve ease of ADL efficiency.                 IE: frequently awakens               JULES: Improvement, reported sleeping well last night 9/18/2020    PLAN  []  Upgrade activities as tolerated     [x]  Continue plan of care  []  Update interventions per flow sheet       []  Discharge due to:_  []  Other:_      Kvng Garcia, PT 9/18/2020  7:04 AM    No future appointments.

## 2020-09-22 ENCOUNTER — HOSPITAL ENCOUNTER (OUTPATIENT)
Dept: PHYSICAL THERAPY | Age: 60
Discharge: HOME | End: 2020-09-22
Payer: COMMERCIAL

## 2020-09-22 PROCEDURE — 97110 THERAPEUTIC EXERCISES: CPT

## 2020-09-22 PROCEDURE — 97140 MANUAL THERAPY 1/> REGIONS: CPT

## 2020-09-22 NOTE — PROGRESS NOTES
PT DAILY TREATMENT NOTE 10-18    Patient Name: Joanna Maya  Date:2020  : 1960  [x]  Patient  Verified  Payor: BLUE CROSS / Plan: [employer] / Product Type: PPO /    In time:7:00  Out time:7:43  Total Treatment Time (min): 43  Visit #: 1 of 8    Medicare/BCBS Only   Total Timed Codes (min):  33 1:1 Treatment Time:  33       Treatment Area: Low back pain [M54.5]    SUBJECTIVE  Pain Level (0-10 scale): 4/10  Any medication changes, allergies to medications, adverse drug reactions, diagnosis change, or new procedure performed?: [x] No    [] Yes (see summary sheet for update)  Subjective functional status/changes:   [] No changes reported  Pt reports her back feels tight and she has some pain into her left leg. OBJECTIVE    Modality rationale: decrease inflammation and decrease pain to improve the patients ability to perform ADLs with increased ease.     Min Type Additional Details    [] Estim:  []Unatt       []IFC  []Premod                        []Other:  []w/ice   []w/heat  Position:  Location:    [] Estim: []Att    []TENS instruct  []NMES                    []Other:  []w/US   []w/ice   []w/heat  Position:  Location:    []  Traction: [] Cervical       []Lumbar                       [] Prone          []Supine                       []Intermittent   []Continuous Lbs:  [] before manual  [] after manual    []  Ultrasound: []Continuous   [] Pulsed                           []1MHz   []3MHz W/cm2:  Location:    []  Iontophoresis with dexamethasone         Location: [] Take home patch   [] In clinic   10 [x]  Ice     []  heat  []  Ice massage  []  Laser   []  Anodyne Position:sitting  Location:lumbar    []  Laser with stim  []  Other:  Position:  Location:    []  Vasopneumatic Device Pressure:       [] lo [] med [] hi   Temperature: [] lo [] med [] hi   [] Skin assessment post-treatment:  []intact []redness- no adverse reaction    []redness  adverse reaction:     25 min Therapeutic Exercise:  [x] See flow sheet :   Rationale: increase ROM and increase strength to improve the patients ability to perform ADLs with increased ease. 8 min Manual Therapy:  DTM to left glute and bilateral lumbar paraspinals with patient in prone. Rationale: decrease pain, increase ROM, increase tissue extensibility and decrease trigger points to improve functional mobility. With   [] TE   [] TA   [] neuro   [] other: Patient Education: [x] Review HEP    [] Progressed/Changed HEP based on:   [] positioning   [] body mechanics   [] transfers   [] heat/ice application    [] other:      Other Objective/Functional Measures:      Pain Level (0-10 scale) post treatment: 4/10    ASSESSMENT/Changes in Function: Good core stability when performing seated marching and alt UE/LE    Patient will continue to benefit from skilled PT services to modify and progress therapeutic interventions, address functional mobility deficits, address ROM deficits, address strength deficits, analyze and address soft tissue restrictions, analyze and cue movement patterns and analyze and modify body mechanics/ergonomics to attain remaining goals. []  See Plan of Care  []  See progress note/recertification  []  See Discharge Summary         Progress towards goals / Updated goals:  Updated Goals: to be achieved in 4 weeks:              1. The patient will perform chores and housework with moderate difficulty to improve ease of chore production.               KQ pt reports increased pain and stiffness due to cleaning over the weekend. 2. The patient will improve hip ABD/EXT to 4+/5 MMT to improve stability in stance.              PN: 4/5 MMT 9/18/2020 B               4. The patient will report being able to sleep through the night without awakening due to pain to improve ease of ADL efficiency.                 PN: improvement, reported sleeping well last night, but pain most nights.     PLAN  []  Upgrade activities as tolerated     [x]  Continue plan of care  []  Update interventions per flow sheet       []  Discharge due to:_  []  Other:_      Dedrick Méndez, PTA 9/22/2020  7:06 AM    Future Appointments   Date Time Provider Colleen Red   9/25/2020  3:45 PM [facility]   9/29/2020  7:00 AM Sammy Max, PTA [facility]   10/2/2020  7:00 AM [facility]

## 2020-09-25 ENCOUNTER — HOSPITAL ENCOUNTER (OUTPATIENT)
Dept: PHYSICAL THERAPY | Age: 60
Discharge: HOME | End: 2020-09-25
Payer: COMMERCIAL

## 2020-09-25 PROCEDURE — 97140 MANUAL THERAPY 1/> REGIONS: CPT

## 2020-09-25 PROCEDURE — 97110 THERAPEUTIC EXERCISES: CPT

## 2020-09-25 NOTE — PROGRESS NOTES
PT DAILY TREATMENT NOTE 10-18    Patient Name: Yamilet Jiang  Date:2020  : 1960  [x]  Patient  Verified  Payor: BLUE CROSS / Plan: [address] [place] [address] [place] / Product Type: PPO /    In time:345  Out time:440  Total Treatment Time (min): 55  Visit #: 2 of 8    Medicare/BCBS Only   Total Timed Codes (min):  55 1:1 Treatment Time:  45       Treatment Area: Low back pain [M54.5]    SUBJECTIVE  Pain Level (0-10 scale): 3-4  Any medication changes, allergies to medications, adverse drug reactions, diagnosis change, or new procedure performed?: [x] No    [] Yes (see summary sheet for update)  Subjective functional status/changes:   [] No changes reported  Pt reports LBP present on left and right side diffusely today. Continued discomfort with walking. OBJECTIVE    Modality rationale: decrease inflammation and decrease pain to improve the patients ability to tolerate ADLs. Min Type Additional Details   10 [x]  Ice     []  heat  []  Ice massage  []  Laser   []  Anodyne Position: seated  Location: lumbar spine   [] Skin assessment post-treatment:  []intact []redness- no adverse reaction    []redness  adverse reaction:     35 min Therapeutic Exercise:  [x] See flow sheet :   Rationale: increase ROM and increase strength to improve the patients ability to perform ADLs with increased ease. 10 min Manual Therapy:  Pt prone -- DTM to glute med and piriformis bilaterally. B QL and lumbar erector STM   Rationale: decrease pain, increase ROM, increase tissue extensibility and decrease trigger points to improve functional mobility.           With   [] TE   [] TA   [] neuro   [] other: Patient Education: [x] Review HEP    [] Progressed/Changed HEP based on:   [] positioning   [] body mechanics   [] transfers   [] heat/ice application    [] other:      Other Objective/Functional Measures: re-initiated S/L hip ABD exercises with good tolerance     Pain Level (0-10 scale) post treatment: 2    ASSESSMENT/Changes in Function: Pt performs exercises as directed with minor low back discomfort. Progressed seated gerber disc exercises by raising table height for only \"tippy toe contacts. \" Trigger points noted left > right in the glute med and piriformis today. Continue to progress per POC. Patient will continue to benefit from skilled PT services to modify and progress therapeutic interventions, address functional mobility deficits, address ROM deficits, address strength deficits, analyze and address soft tissue restrictions, analyze and cue movement patterns, analyze and modify body mechanics/ergonomics, assess and modify postural abnormalities and instruct in home and community integration to attain remaining goals. [x]  See Plan of Care  []  See progress note/recertification  []  See Discharge Summary         Progress towards goals / Updated goals:  Updated Goals: to be achieved in 4 weeks:  1. The patient will perform chores and housework with moderate difficulty to improve ease of chore production.               PN: pt reports increased pain and stiffness due to cleaning over the weekend. 2. The patient will improve hip ABD/EXT to 4+/5 MMT to improve stability in stance.              PN: 4/5 MMT 9/18/2020 B  3. The patient will report being able to sleep through the night without awakening due to pain to improve ease of ADL efficiency.                PN: improvement, reported sleeping well last night, but pain most nights.    Current: progressing, some night wake up at 2AM and can't go back to bed but last night slept good until 5AM (9/25/2020)    PLAN  []  Upgrade activities as tolerated     [x]  Continue plan of care  []  Update interventions per flow sheet       []  Discharge due to:_  []  Other:_      Jenni Wang, PT 9/25/2020  3:53 PM    Future Appointments   Date Time Provider Colleen Red   9/29/2020  7:00 AM Thuy Yates PTA [facility]   10/2/2020  7:00 AM [facility]y Hernandez[facility]

## 2020-09-29 ENCOUNTER — HOSPITAL ENCOUNTER (OUTPATIENT)
Dept: PHYSICAL THERAPY | Age: 60
Discharge: HOME | End: 2020-09-29
Payer: COMMERCIAL

## 2020-09-29 PROCEDURE — 97110 THERAPEUTIC EXERCISES: CPT

## 2020-09-29 PROCEDURE — 97140 MANUAL THERAPY 1/> REGIONS: CPT

## 2020-09-29 NOTE — PROGRESS NOTES
PT DAILY TREATMENT NOTE 10-18    Patient Name: Danny Stevens  Date:2020  : 1960  [x]  Patient  Verified  Payor: BLUE CROSS / Plan: [facility] [facility] [place] / Product Type: PPO /    In time:7:00  Out time:7:38  Total Treatment Time (min): 38  Visit #: 3 of 8    Medicare/BCBS Only   Total Timed Codes (min):  38 1:1 Treatment Time:  38       Treatment Area: Low back pain [M54.5]    SUBJECTIVE  Pain Level (0-10 scale): 0/10  Any medication changes, allergies to medications, adverse drug reactions, diagnosis change, or new procedure performed?: [x] No    [] Yes (see summary sheet for update)  Subjective functional status/changes:   [] No changes reported  Pt reports no current c/o pain but reports having increased pain at about 3 AM today that lasted for 20 minutes and then went away. OBJECTIVE    30 min Therapeutic Exercise:  [x] See flow sheet :   Rationale: increase ROM and increase strength to improve the patients ability to perform ADLs. 8 min Manual Therapy:  DTM to bilateral LS paraspinals,piriformis, glutes and QL with patient in prone with pillow under hips. Rationale: decrease pain, increase ROM and increase tissue extensibility to improve tolerance to ADLs. With   [] TE   [] TA   [] neuro   [] other: Patient Education: [x] Review HEP    [] Progressed/Changed HEP based on:   [] positioning   [] body mechanics   [] transfers   [] heat/ice application    [] other:      Other Objective/Functional Measures: Pt guarded when performing standing exercises. Pain Level (0-10 scale) post treatment: 0/10    ASSESSMENT/Changes in Function: Pt demonstrated guarded posture today when performing standing exercises due to fear of increased pain.      Patient will continue to benefit from skilled PT services to modify and progress therapeutic interventions, address functional mobility deficits, address ROM deficits, address strength deficits, analyze and address soft tissue restrictions, analyze and cue movement patterns and analyze and modify body mechanics/ergonomics to attain remaining goals. []  See Plan of Care  []  See progress note/recertification  []  See Discharge Summary         Progress towards goals / Updated goals:  Updated Goals: to be achieved in 4 weeks:  1. The patient will perform chores and housework with moderate difficulty to improve ease of chore production.               PN: pt reports increased pain and stiffness due to cleaning over the weekend. 2. The patient will improve hip ABD/EXT to 4+/5 MMT to improve stability in stance.              PN: 4/5 MMT 9/18/2020 B  3. The patient will report being able to sleep through the night without awakening due to pain to improve ease of ADL efficiency.                PN: improvement, reported sleeping well last night, but pain most nights.               Current: progressing, some night wake up at 2AM and can't go back to bed but last night slept good until 5AM (9/25/2020)    PLAN  []  Upgrade activities as tolerated     [x]  Continue plan of care  []  Update interventions per flow sheet       []  Discharge due to:_  []  Other:_      Quincy Cooks, PTA 9/29/2020  7:05 AM    Future Appointments   Date Time Provider Colleen Red   10/2/2020  7:00 AM Lieutenant Norah GONZALEZ, PT [facility]

## 2020-10-02 ENCOUNTER — HOSPITAL ENCOUNTER (OUTPATIENT)
Dept: PHYSICAL THERAPY | Age: 60
Discharge: HOME | End: 2020-10-02
Payer: COMMERCIAL

## 2020-10-02 PROCEDURE — 97110 THERAPEUTIC EXERCISES: CPT

## 2020-10-02 PROCEDURE — 97112 NEUROMUSCULAR REEDUCATION: CPT

## 2020-10-02 PROCEDURE — 97140 MANUAL THERAPY 1/> REGIONS: CPT
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
## 2020-10-09 ENCOUNTER — HOSPITAL ENCOUNTER (OUTPATIENT)
Dept: PHYSICAL THERAPY | Age: 60
Discharge: HOME | End: 2020-10-09
Payer: COMMERCIAL

## 2020-10-09 PROCEDURE — 97110 THERAPEUTIC EXERCISES: CPT

## 2020-10-09 PROCEDURE — 97112 NEUROMUSCULAR REEDUCATION: CPT

## 2020-10-09 PROCEDURE — 97140 MANUAL THERAPY 1/> REGIONS: CPT

## 2020-10-09 NOTE — PROGRESS NOTES
PT DAILY TREATMENT NOTE 10-18    Patient Name: Joseph Go  Date:10/9/2020  : 1960  [x]  Patient  Verified  Payor: BLUE CROSS / Plan: [employer] [facility] [place] / Product Type: PPO /    In time:215  Out time:310  Total Treatment Time (min): 55  Visit #: 5 of 8    Medicare/BCBS Only   Total Timed Codes (min):  55 1:1 Treatment Time:  45       Treatment Area: Low back pain [M54.5]    SUBJECTIVE  Pain Level (0-10 scale): 2  Any medication changes, allergies to medications, adverse drug reactions, diagnosis change, or new procedure performed?: [x] No    [] Yes (see summary sheet for update)  Subjective functional status/changes:   [] No changes reported  Reports has been waking at night due to pain or restroom. Feeling stiff today. OBJECTIVE    27 min Therapeutic Exercise:  [x] See flow sheet :   Rationale: increase ROM and increase strength to improve the patients ability to perform pain free ADLs. 10 min Neuromuscular Re-education:  [x]  See flow sheet : seated gerber disc, 1/2 prone hip extension series   Rationale: increase strength, improve coordination and increase proprioception  to improve the patients ability to improve abdominal and gluteal stability for dynamic ADLs. 8 min Manual Therapy:  Pt prone -- DTM to lumbar erectors   Rationale: decrease pain, increase ROM and increase tissue extensibility to improve tolerance to self care and functional activities. With   [] TE   [] TA   [] neuro   [] other: Patient Education: [x] Review HEP    [] Progressed/Changed HEP based on:   [] positioning   [] body mechanics   [] transfers   [] heat/ice application    [] other:      Other Objective/Functional Measures: exercises not progressed today secondary to pt reports of fatigue     Pain Level (0-10 scale) post treatment: 0, gluteal cramping    ASSESSMENT/Changes in Function: Pt performs all exercises as directed, requiring intermittent rest breaks.  Gluteals continue to need strengthening, but pt does complain of cramping at end of session. Pt would benefit from addition of glute stretching following therEx to decrease tightness and reduce cramping pain. Patient will continue to benefit from skilled PT services to modify and progress therapeutic interventions, address functional mobility deficits, address ROM deficits, address strength deficits, analyze and address soft tissue restrictions, analyze and cue movement patterns, analyze and modify body mechanics/ergonomics, assess and modify postural abnormalities and instruct in home and community integration to attain remaining goals. [x]  See Plan of Care  [x]  See progress note/recertification  []  See Discharge Summary         Progress towards goals / Updated goals:  1. The patient will perform chores and housework with moderate difficulty to improve ease of chore production.               PN: pt reports increased pain and stiffness due to cleaning over the weekend. 2. The patient will improve hip ABD/EXT to 4+/5 MMT to improve stability in stance.              PN: 4/5 MMT 9/18/2020 B              Current; weakness mostly associated with right hip extension noting 3-/5 MMT today after exercise 10/02/2020  3. The patient will report being able to sleep through the night without awakening due to pain to improve ease of ADL efficiency.                PN: improvement, reported sleeping well last night, but pain most nights.              Current: progressing, waking at night to use restroom but turning in bed increasing pain 10/9/2020    PLAN  []  Upgrade activities as tolerated     [x]  Continue plan of care  []  Update interventions per flow sheet       []  Discharge due to:_  []  Other:_      Chikis Rai, PT 10/9/2020  2:23 PM    Future Appointments   Date Time Provider Colleen Red   10/13/2020  7:45 AM Hilda Ramirez, PTA [facility]   10/16/2020  7:00 AM Gabriele Mejia, PT [facility]   10/21/2020  7:00 AM [facility]erril[facility]

## 2020-10-13 ENCOUNTER — HOSPITAL ENCOUNTER (OUTPATIENT)
Dept: PHYSICAL THERAPY | Age: 60
Discharge: HOME | End: 2020-10-13
Payer: COMMERCIAL

## 2020-10-13 PROCEDURE — 97112 NEUROMUSCULAR REEDUCATION: CPT

## 2020-10-13 PROCEDURE — 97140 MANUAL THERAPY 1/> REGIONS: CPT

## 2020-10-13 PROCEDURE — 97110 THERAPEUTIC EXERCISES: CPT

## 2020-10-13 NOTE — PROGRESS NOTES
PT DAILY TREATMENT NOTE 10-18    Patient Name: Anthony Finney  Date:10/13/2020  : 1960  [x]  Patient  Verified  Payor: BLUE CROSS / Plan: [facility] [place] / Product Type: PPO /    In time:7:47  Out time:8:25  Total Treatment Time (min): 38  Visit #: 6 of 8    Medicare/BCBS Only   Total Timed Codes (min):  38 1:1 Treatment Time:  38       Treatment Area: Low back pain [M54.5]    SUBJECTIVE  Pain Level (0-10 scale): 3/10  Any medication changes, allergies to medications, adverse drug reactions, diagnosis change, or new procedure performed?: [x] No    [] Yes (see summary sheet for update)  Subjective functional status/changes:   [] No changes reported  Pt reports no new complaints of pain. Pt reports compliance with HEP. OBJECTIVE    20 min Therapeutic Exercise:  [x] See flow sheet :    Rationale: increase ROM and increase strength to improve the patients ability to perform ADLs. 10 min Neuromuscular Re-education:  [x]  See flow sheet :   Rationale: increase strength, improve coordination and increase proprioception  to improve the patients ability to perform ADLs with increased ease. 8 min Manual Therapy:  DTM to bilateral Lumbar paraspinals; DTM to bilateral QL. Rationale: decrease pain, increase ROM and increase tissue extensibility to improve functional mobility. With   [] TE   [] TA   [] neuro   [] other: Patient Education: [x] Review HEP    [] Progressed/Changed HEP based on:   [] positioning   [] body mechanics   [] transfers   [] heat/ice application    [] other:      Other Objective/Functional Measures: Mod vc's to engage TA with dynamic  Core strengthening exercises. Pain Level (0-10 scale) post treatment: 0/10    ASSESSMENT/Changes in Function: Pt continues to demonstrate improved functional strength and mobility but fatigues quickly with core strengthening exercises.      Patient will continue to benefit from skilled PT services to modify and progress therapeutic interventions, address functional mobility deficits, address ROM deficits, address strength deficits, analyze and address soft tissue restrictions, analyze and cue movement patterns and analyze and modify body mechanics/ergonomics to attain remaining goals. []  See Plan of Care  []  See progress note/recertification  []  See Discharge Summary         Progress towards goals / Updated goals:  1. The patient will perform chores and housework with moderate difficulty to improve ease of chore production.               PN: pt reports increased pain and stiffness due to cleaning over the weekend. 2. The patient will improve hip ABD/EXT to 4+/5 MMT to improve stability in stance.              PN: 4/5 MMT 9/18/2020 B              Current; weakness mostly associated with right hip extension noting 3-/5 MMT today after exercise 10/02/2020  3. The patient will report being able to sleep through the night without awakening due to pain to improve ease of ADL efficiency.                PN: improvement, reported sleeping well last night, but pain most nights.              Current: progressing, waking at night to use restroom but turning in bed increasing pain 10/9/2020    PLAN  []  Upgrade activities as tolerated     [x]  Continue plan of care  []  Update interventions per flow sheet       []  Discharge due to:_  []  Other:_      Nathan Nail, PTA 10/13/2020  7:54 AM    Future Appointments   Date Time Provider Colleen Red   10/16/2020  7:00 AM [facility]   10/21/2020  7:00 AM Devon Watts, PT [facility]

## 2020-10-16 ENCOUNTER — HOSPITAL ENCOUNTER (OUTPATIENT)
Dept: PHYSICAL THERAPY | Age: 60
Discharge: HOME | End: 2020-10-16
Payer: COMMERCIAL

## 2020-10-16 PROCEDURE — 97112 NEUROMUSCULAR REEDUCATION: CPT

## 2020-10-16 PROCEDURE — 97140 MANUAL THERAPY 1/> REGIONS: CPT

## 2020-10-16 PROCEDURE — 97110 THERAPEUTIC EXERCISES: CPT

## 2020-10-16 NOTE — PROGRESS NOTES
PT DAILY TREATMENT NOTE 10-18    Patient Name: Josh Grande  Date:10/16/2020  : 1960  [x]  Patient  Verified  Payor: BLUE CROSS / [facility] [place] / Product Type: PPO /    In time:7:01  Out time:7:54  Total Treatment Time (min): 53  Visit #: 7 of 8    Medicare/BCBS Only   Total Timed Codes (min):  43 1:1 Treatment Time:  43       Treatment Area: Low back pain [M54.5]    SUBJECTIVE  Pain Level (0-10 scale): 3/10  Any medication changes, allergies to medications, adverse drug reactions, diagnosis change, or new procedure performed?: [x] No    [] Yes (see summary sheet for update)  Subjective functional status/changes:   [] No changes reported  The patient states that she is doing fairly well upon arrival.     OBJECTIVE  23 min Therapeutic Exercise:  [x] See flow sheet :   Rationale: increase ROM and increase strength to improve the patients ability to improve ADL ease. 12 min Neuromuscular Re-education:  [x]  See flow sheet :   Rationale: improve coordination and improve balance  to improve the patients ability to improve ADL ease. 8 min Manual Therapy: Lumbar MFR performed with the patient in prone B QL. Rationale: decrease pain, increase ROM, increase tissue extensibility and decrease trigger points to improve ADL ease. With   [] TE   [] TA   [] neuro   [] other: Patient Education: [x] Review HEP    [] Progressed/Changed HEP based on:   [] positioning   [] body mechanics   [] transfers   [] heat/ice application    [] other:      Other Objective/Functional Measures:   Hip extension strength: 4/5 MMT B    Pain Level (0-10 scale) post treatment: 0/10    ASSESSMENT/Changes in Function: The patient states that she does continue to feel progress and agrees to continue PT, but to drop down to 1 time a week for the next 2 weeks. The patient left the clinic in no pain.      Patient will continue to benefit from skilled PT services to modify and progress therapeutic interventions, address functional mobility deficits, address ROM deficits, address strength deficits, analyze and address soft tissue restrictions, analyze and cue movement patterns, analyze and modify body mechanics/ergonomics, assess and modify postural abnormalities and instruct in home and community integration to attain remaining goals. []  See Plan of Care  []  See progress note/recertification  []  See Discharge Summary         Progress towards goals / Updated goals:  1. The patient will perform chores and housework with moderate difficulty to improve ease of chore production.              PN: pt reports increased pain and stiffness due to cleaning over the weekend. 2. The patient will improve hip ABD/EXT to 4+/5 MMT to improve stability in stance.              PN: 4/5 MMT 9/18/2020 B              Current; Improved glute strength improved to 4/5 MMT hip extension noted today 10/16/2020  3. The patient will report being able to sleep through the night without awakening due to pain to improve ease of ADL efficiency.                PN: improvement, reported sleeping well last night, but pain most nights.              Current: progressing, waking at night to use restroom but turning in bed increasing pain 10/9/2020    PLAN  []  Upgrade activities as tolerated     [x]  Continue plan of care  []  Update interventions per flow sheet       []  Discharge due to:_  []  Other:_      Kai Peralta, PT 10/16/2020  7:06 AM    Future Appointments   Date Time Provider Colleen Red   10/20/2020  2:15 PM Solo Cobb, PT [id number]

## 2020-10-20 ENCOUNTER — APPOINTMENT (OUTPATIENT)
Dept: PHYSICAL THERAPY | Age: 60
End: 2020-10-20
Payer: COMMERCIAL

## 2020-10-21 ENCOUNTER — APPOINTMENT (OUTPATIENT)
Dept: PHYSICAL THERAPY | Age: 60
End: 2020-10-21
Payer: COMMERCIAL

## 2020-10-27 ENCOUNTER — HOSPITAL ENCOUNTER (OUTPATIENT)
Dept: PHYSICAL THERAPY | Age: 60
Discharge: HOME | End: 2020-10-27
Payer: COMMERCIAL

## 2020-10-27 PROCEDURE — 97110 THERAPEUTIC EXERCISES: CPT

## 2020-10-27 PROCEDURE — 97140 MANUAL THERAPY 1/> REGIONS: CPT

## 2020-10-27 PROCEDURE — 97112 NEUROMUSCULAR REEDUCATION: CPT

## 2020-10-27 NOTE — PROGRESS NOTES
PT DAILY TREATMENT NOTE 10-18    Patient Name: Josh Grande  Date:10/27/2020  : 1960  [x]  Patient  Verified  Payor: BLUE CROSS / [facility] [place] / Product Type: PPO /    In time:12:53  Out time:1:33  Total Treatment Time (min): 40  Visit #: 8 of 8    Medicare/BCBS Only   Total Timed Codes (min):  40 1:1 Treatment Time:  40       Treatment Area: Low back pain [M54.5]    SUBJECTIVE  Pain Level (0-10 scale): 10  Any medication changes, allergies to medications, adverse drug reactions, diagnosis change, or new procedure performed?: [x] No    [] Yes (see summary sheet for update)  Subjective functional status/changes:   [] No changes reported  The patient states that on Friday she had increased pain through her right leg that started on Friday. She indicates its a little better today, but it is more painful. She reports that she is due to have a follow-up next week. OBJECTIVE  20 min Therapeutic Exercise:  [x] See flow sheet :   Rationale: increase ROM and increase strength to improve the patients ability to improve ADL ease. 12 min Neuromuscular Re-education:  [x]  See flow sheet :   Rationale: improve coordination, improve balance and increase proprioception  to improve the patients ability to improve ADL ease. 8 min Manual Therapy: Lumbar MFR performed with the patient in prone B QL. TPR of right piriformis with patient in prone pillow beneath hips.    Rationale: decrease pain, increase ROM, increase tissue extensibility and decrease trigger points to improve ADL ease.           With   [] TE   [] TA   [] neuro   [] other: Patient Education: [x] Review HEP    [] Progressed/Changed HEP based on:   [] positioning   [] body mechanics   [] transfers   [] heat/ice application    [] other:      Other Objective/Functional Measures  The patient did not have any increased pain following     Pain Level (0-10 scale) post treatment: 5/10    ASSESSMENT/Changes in Function:  The demonstrates fairly good hip strength and normal gait. She does complain of right leg pain that has increased since last Friday and does not appear to change throughout session. She is in agreement to D/C PT and continue with HEP. Patient will continue to benefit from skilled PT services to modify and progress therapeutic interventions, address functional mobility deficits, address ROM deficits, address strength deficits, analyze and address soft tissue restrictions, analyze and cue movement patterns, analyze and modify body mechanics/ergonomics, assess and modify postural abnormalities and instruct in home and community integration to attain remaining goals. []  See Plan of Care  []  See progress note/recertification  []  See Discharge Summary         Progress towards goals / Updated goals:  1. The patient will perform chores and housework with moderate difficulty to improve ease of chore production.              PN: pt reports increased pain and stiffness due to cleaning over the weekend. Current: Continues to report moderate difficulty with activity. 10/27/2020  2. The patient will improve hip ABD/EXT to 4+/5 MMT to improve stability in stance.              PN: 4/5 MMT 9/18/2020 B              Current; Improved glute strength improved to 4/5 MMT hip extension noted today 10/16/2020  3. The patient will report being able to sleep through the night without awakening due to pain to improve ease of ADL efficiency.                PN: improvement, reported sleeping well last night, but pain most nights.              Current: progressing, waking at night to use restroom but turning in bed increasing pain 10/9/2020     PLAN  []  Upgrade activities as tolerated     [x]  Continue plan of care  []  Update interventions per flow sheet       []  Discharge due to:_  []  Other:_      Tr Rivera, PT 10/27/2020  12:45 PM    No future appointments.

## 2020-10-27 NOTE — PROGRESS NOTES
[facility]  [address]  [place], [address]  [phone] fax    Physical Therapy Discharge Summary    Patient name: Jaylyn Ray Start of Care: 8/26/2020   Referral source: Andree Cazares MD CHAPITO: [id number]                Medical Diagnosis: Low back pain [M54.5]  Payor: Lala Oliversilvino / Plan: [employer] [facility] [employer] / Product Type: PPO /  Onset Date:7/10/2020                Treatment Diagnosis: LBP   Prior Hospitalization: see medical history Provider#: [id number]   Medications: Verified on Patient summary List    Comorbidities: Arthritis, Depression, HTN, Lumbar fusion, hysterectomy and gall bladder removal.   Prior Level of Function: The patient reports that she had pain with all activity, walking and chore production prior to onset. Visits from Start of Care: 16    Missed Visits: 0  Reporting Period : 9/18/2020 to 10/27/2020    Summary of Care:  1. The patient will perform chores and housework with moderate difficulty to improve ease of chore production.              PN: pt reports increased pain and stiffness due to cleaning over the weekend. Current: Continues to report moderate difficulty with activity. 10/27/2020  2. The patient will improve hip ABD/EXT to 4+/5 MMT to improve stability in stance.              PN: 4/5 MMT 9/18/2020 B              Current; Improved glute strength improved to 4/5 MMT hip extension noted today 10/16/2020  3. The patient will report being able to sleep through the night without awakening due to pain to improve ease of ADL efficiency.                PN: improvement, reported sleeping well last night, but pain most nights.              Current: progressing, waking at night to use restroom but turning in bed increasing pain 10/9/2020    ASSESSMENT/RECOMMENDATIONS: The demonstrates fairly good hip strength and normal gait.  She does complain of right leg pain that has increased since last Friday and does not appear to change throughout session. Fairly good progress towards goals, though limited progress attained over last reporting period.  She is in agreement to D/C PT and continue with HEP.      [x]Discontinue therapy: [x]Patient has reached or is progressing toward set goals      []Patient is non-compliant or has abdicated      []Due to lack of appreciable progress towards set [address], PT 10/27/2020 2:22 PM

## 2022-09-30 ENCOUNTER — HOSPITAL ENCOUNTER (OUTPATIENT)
Dept: PHYSICAL THERAPY | Age: 62
Discharge: HOME | End: 2022-09-30
Payer: COMMERCIAL

## 2022-09-30 PROCEDURE — 97140 MANUAL THERAPY 1/> REGIONS: CPT

## 2022-09-30 PROCEDURE — 97162 PT EVAL MOD COMPLEX 30 MIN: CPT

## 2022-09-30 PROCEDURE — 97530 THERAPEUTIC ACTIVITIES: CPT

## 2022-09-30 NOTE — PROGRESS NOTES
[facility] - [place] [address]  [phone] fax  Plan of Care/ Statement of Necessity for Physical Therapy Services     Patient name: Donnette Aase Start of Care: 2022   Referral source: Octavio Blue MD : 1960    Medical Diagnosis: Other low back pain [M54.59]  Right shoulder pain [M25.511]  Systemic lupus erythematosus, unspecified [M32.9]  Payor: BLUE CROSS / Plan:  [facility] / Product Type: PPO /  Onset Date:22    Treatment Diagnosis: LBP/ Right shoulder pain    Prior Hospitalization: see medical history Provider#: [id number]   Medications: Verified on Patient summary List    Comorbidities:  HTN (controlled), Lupus, 2020 lumbar fusion,  laminectomy in lumbar spine,    Prior Level of Function: Pt lives at home alone in a one story house, ambulating without AD at time of evaluation, retried. The Plan of Care and following information is based on the information from the initial evaluation. Assessment/ key information: Pt is a 57 y.o. female who presents with c/o LBP and right shoulder pain. Functional deficits include: difficulty, walking, crafting, housework. Upon exam, Pt exhibited decreased strength, ROM, impaired alignment, increased muscle tightness, and decreased functional strength as assessed by 30 second STS. Pt would benefit from skilled PT to address above deficits to improve Pt's function and ability to return to PLOF without pain or restriction.      Evaluation Complexity History HIGH Complexity :3+ comorbidities / personal factors will impact the outcome/ POC ; Examination MEDIUM Complexity : 3 Standardized tests and measures addressing body structure, function, activity limitation and / or participation in recreation  ;Presentation LOW Complexity : Stable, uncomplicated  ;Clinical Decision Making MEDIUM Complexity : FOTO score of 26-74  Overall Complexity Rating: MEDIUM  Problem List: pain affecting function, decrease ROM, decrease strength, edema affecting function, impaired gait/ balance, decrease ADL/ functional abilitiies, decrease activity tolerance, decrease flexibility/ joint mobility, and decrease transfer abilities   Treatment Plan may include any combination of the following: Therapeutic exercise, Therapeutic activities, Neuromuscular re-education, Physical agent/modality, Gait/balance training, Manual therapy, Aquatic therapy, Patient education, Self Care training, Functional mobility training, Home safety training, and Stair training  Patient / Family readiness to learn indicated by: asking questions, trying to perform skills, and interest  Persons(s) to be included in education: patient (P)  Barriers to Learning/Limitations: None  Patient Goal (s): I want to be able to do my regular daily things without pain  Patient Self Reported Health Status: good  Rehabilitation Potential: good    Short Term Goals: To be accomplished in 1 weeks:  Goal: Pt to be compliant with initial HEP to improve Strength and functional mobility  Status at last note/certification: Established and reviewed with Pt  Goal: Pt to initiate aquatics program without increased pain to aid in achievement of all LTGs. Status at last note/certification: N/A  Long Term Goals: To be accomplished in 4 weeks:  Goal: Pt to perform 12 STS in 30 seconds without UE support for improved functional strength  Status at last note/certification: 46G without UE support  Goal: Pt to report < 2/10 pain at worst to increase ease with ADLs. Status at last note/certification: 8/75 pain at worst  Goal: Pt to report FOTO score of 58 to show improved function and quality of life. Status at last note/certification: FOTO 55 pts     Frequency / Duration: Patient to be seen 2 times per week for 4 weeks.     Patient/ Caregiver education and instruction: Diagnosis, prognosis, self care, activity modification, brace/ splint application, and exercises   [x]  Plan of care has been reviewed with JOHN Turcios, PT 9/30/2022 1:36 PM  _____________________________________________________________________  I certify that the above Therapy Services are being furnished while the patient is under my care. I agree with the treatment plan and certify that this therapy is necessary.     Physician's Signature:____________Date:_________TIME:________     Lisa Vizcaino MD  ** Signature, Date and Time must be completed for valid certification **    Please sign and return to [facility] - [address]  [phone] fax

## 2022-09-30 NOTE — PROGRESS NOTES
PT DAILY TREATMENT NOTE/LUMBAR EVAL     Patient Name: Ángel Whitley  Date:2022  : 1960  [x]  Patient  Verified  Payor: BLUE CROSS / Plan: [facility] [place] / Product Type: PPO /    In time:1205  Out time:1245  Total Treatment Time (min): 40  Visit #: 1 of 8    Medicare/BCBS Only   Total Timed Codes (min):  25 1:1 Treatment Time:  25     Treatment Area: Other low back pain [M54.59]  Right shoulder pain [M25.511]  Systemic lupus erythematosus, unspecified [M32.9]    SUBJECTIVE  Pain Level (0-10 scale): 3/10 (now), 0/10 (best), 6/10 (worst)- doing more movement or trying to do more housework   []constant []intermittent []improving []worsening []no change since onset    Any medication changes, allergies to medications, adverse drug reactions, diagnosis change, or new procedure performed?: [x] No    [] Yes (see summary sheet for update)  Subjective functional status/changes:  Pt presents to PT with history of back pain with a hx of back surgeries, with most recent surgery in 2020 Lumbar fusion L5-S1. Pt reports she had PT initially following surgery however she is still having difficulty. Pt reports hx of shoulder pain about 6 months or more. With unknown onset. Pt reports she has an ultrasound scheduled for today to assess potential DVT in left LE. PLOF: Pt ambulating without AD at time of evaluation  Limitations to PLOF: pain  Mechanism of Injury: Chronic in nature since surgery in 2020 lumbar fusion  Current symptoms/Complaints: low back pain and  right shoulder pian. Previous Treatment/Compliance: Pt has has PT in the past following back surgery  PMHx/Surgical Hx:  HTN (controlled), Lupus, 2020 lumbar fusion, 2014 laminectomy in lumbar spine,   Work Hx: retired  Living Situation: Pt lives at home alone in one story house with 4 steps to get in.    Pt Goals: \"I want to be able to do my regular daily things without pain\"  Barriers: []pain [x]financial []time []transportation []other  Motivation: Pt appears motivated to participate in PT  Substance use: none     OBJECTIVE/EXAMINATION  Activity/Recreational Limitations: walking, crafting, housework  Mobility: Pt ambulating without AD at time of session. Self Care: pt is functionally independent with all ADLs however with modification due to fatigue. 15 min [x]Eval                  []Re-Eval       15 min Therapeutic Activity:  []  See flow sheet : rehab progression, HEP, activity modification. Rationale: increase ROM, increase strength, improve coordination, and improve balance  to improve the patients ability to tolerate ADLs and household activities      10 min Manual Therapy:  STM/TPR to right QL and MET to correct upslip of right pelvis   The manual therapy interventions were performed at a separate and distinct time from the therapeutic activities interventions. Rationale: decrease pain, increase ROM, increase tissue extensibility, decrease trigger points, and increase postural awareness to tolerate functional mobility             With   [] TE   [] TA   [] neuro   [] other: Patient Education: [x] Review HEP    [] Progressed/Changed HEP based on:   [] positioning   [] body mechanics   [] transfers   [] heat/ice application    [] other:      Other Objective/Functional Measures: Established HEP    Physical Therapy Evaluation - Lumbar Spine ([facility])    SUBJECTIVE  Chief Complaint:    Mechanism of injury:    Symptoms:  Aggravated by:   [] Bending [] Sitting [] Standing [x] Walking   [] Moving [] Cough [] Sneeze [] Valsalva   [] AM  [] PM  Lying:  [] sup   [] pro   [] sidelying   [x] Other:vacuuming, standing, washing dishes     Eased by:    [x] Bending [] Sitting [] Standing [] Walking   [] Moving [] AM  [] PM  Lying: [] sup  [] pro  [x] sidelying   [] Other:     General Health:  Red Flags Indicated? [] Yes    [] No  [] Yes [x] No Recent weight change (If yes, due to dieting?  [] Yes  [] No)   [x] Yes [] No Weakness in legs during walking  [x] Yes [] No Unremitting pain at night  [] Yes [x] No Abdominal pain or problems  [] Yes [x] No Rectal bleeding  [] Yes [x] No Feet more cold or painful in cold weather  [] Yes [x] No Menstrual irregularities  [] Yes [x] No Blood or pain with urination  [] Yes [x] No Dysfunction of bowel or bladder  [] Yes [x] No Recent illness within past 3 weeks (i.e, cold, flu)  [] Yes [x] No Numbness/tingling in buttock/genitalia region    Diagnostic Tests: pt is scheduled for MRI for headache next week, and has an ultrasound for her left leg to assess potential blood clot. OBJECTIVE  Posture: Rounded shoulders, forward head posture, right pelvic upslip      Gait:  [] Normal     [x] Abnormal:pt with antalgic gait pattern with increased forward trunk lean,    Active Movements: [] N/A   [x] Too acute   [] Other:      Palpation  [] Min  [x] Mod  [] Severe    Location:QL and lumbar paraspinal tightness. Piriformis tightness       Strength: unable to formally assess strength secondary to concerns regarding potential DVT in left LE         Hip:     Piriformis: [x] R    [] L    [x] +    [] -        Other tests/comments:  30 seconds STS: 10x without UE support       Pain Level (0-10 scale) post treatment: 2/10    ASSESSMENT/Changes in Function:     Patient will continue to benefit from skilled PT services to modify and progress therapeutic interventions, address functional mobility deficits, address ROM deficits, address strength deficits, analyze and address soft tissue restrictions, analyze and cue movement patterns, analyze and modify body mechanics/ergonomics, assess and modify postural abnormalities, address imbalance/dizziness, and instruct in home and community integration to attain remaining goals.      [x]  See Plan of Care  []  See progress note/recertification  []  See Discharge Summary         Progress towards goals / Updated goals:  See POC    PLAN  []  Upgrade activities as tolerated []  Continue plan of care  []  Update interventions per flow sheet       []  Discharge due to:_  []  Other:_      Lacy Preciado, PHILIPPE 9/30/2022  12:13 PM

## 2022-10-04 ENCOUNTER — HOSPITAL ENCOUNTER (OUTPATIENT)
Dept: PHYSICAL THERAPY | Age: 62
Discharge: HOME | End: 2022-10-04
Payer: COMMERCIAL

## 2022-10-04 PROCEDURE — 97113 AQUATIC THERAPY/EXERCISES: CPT

## 2022-10-06 ENCOUNTER — HOSPITAL ENCOUNTER (OUTPATIENT)
Dept: PHYSICAL THERAPY | Age: 62
Discharge: HOME | End: 2022-10-06
Payer: COMMERCIAL

## 2022-10-06 PROCEDURE — 97113 AQUATIC THERAPY/EXERCISES: CPT

## 2022-10-11 ENCOUNTER — HOSPITAL ENCOUNTER (OUTPATIENT)
Dept: PHYSICAL THERAPY | Age: 62
Discharge: HOME | End: 2022-10-11
Payer: COMMERCIAL

## 2022-10-11 PROCEDURE — 97113 AQUATIC THERAPY/EXERCISES: CPT

## 2022-10-13 ENCOUNTER — HOSPITAL ENCOUNTER (OUTPATIENT)
Dept: PHYSICAL THERAPY | Age: 62
Discharge: HOME | End: 2022-10-13
Payer: COMMERCIAL

## 2022-10-13 PROCEDURE — 97113 AQUATIC THERAPY/EXERCISES: CPT

## 2022-10-18 ENCOUNTER — APPOINTMENT (OUTPATIENT)
Dept: PHYSICAL THERAPY | Age: 62
End: 2022-10-18
Payer: COMMERCIAL

## 2022-10-20 ENCOUNTER — HOSPITAL ENCOUNTER (OUTPATIENT)
Dept: PHYSICAL THERAPY | Age: 62
Discharge: HOME | End: 2022-10-20
Payer: COMMERCIAL

## 2022-10-20 PROCEDURE — 97113 AQUATIC THERAPY/EXERCISES: CPT

## 2022-10-25 ENCOUNTER — HOSPITAL ENCOUNTER (OUTPATIENT)
Dept: PHYSICAL THERAPY | Age: 62
Discharge: HOME | End: 2022-10-25
Payer: COMMERCIAL

## 2022-10-25 PROCEDURE — 97113 AQUATIC THERAPY/EXERCISES: CPT

## 2022-10-27 ENCOUNTER — HOSPITAL ENCOUNTER (OUTPATIENT)
Dept: PHYSICAL THERAPY | Age: 62
Discharge: HOME | End: 2022-10-27
Payer: COMMERCIAL

## 2022-10-27 PROCEDURE — 97113 AQUATIC THERAPY/EXERCISES: CPT

## 2022-10-27 NOTE — PROGRESS NOTES
[facility] - [facility], [address] [place] [address]  [phone] fax    Discharge Summary    Patient name: Km Ludwig Start of Care: 2022   Referral source: Natasha Nye MD : 1960   Medical/Treatment Diagnosis: Other low back pain [M54.59]  Right shoulder pain [M25.511]  Systemic lupus erythematosus, unspecified [M32.9]  Payor: BLUE CROSS / Plan: [address] [facility] / Product Type: PPO /  Onset Date:2022     Prior Hospitalization: see medical history Provider#: [id number]   Medications: Verified on Patient Summary List    Comorbidities: HTN (controlled), Lupus, 2020 lumbar fusion,  laminectomy in lumbar spine,    Prior Level of Function: Pt lives at home alone in a one story house, ambulating without AD at time of evaluation, retried. Visits from Start of Care: 8    Missed Visits: 0    Reporting Period : 2022 to 10/27/2022    Short Term Goals: To be accomplished in 1 weeks:  Goal: Pt to be compliant with initial HEP to improve Strength and functional mobility  Status at last note/certification: Established and reviewed with Pt  Current: MET- compliant with current HEP; updated to include HEP for post aquatic rehab program  Goal: Pt to initiate aquatics program without increased pain to aid in achievement of all LTGs. Status at last note/certification: N/A  Current: MET - no reports of increased pain after starting aquatic therapy  Long Term Goals: To be accomplished in 4 weeks:  Goal: Pt to perform 12 STS in 30 seconds without UE support for improved functional strength  Status at last note/certification: 10S without UE support  Current: MET - 12x without UE support  Goal: Pt to report < 2/10 pain at worst to increase ease with ADLs.   Status at last note/certification:  pain at worst  Current: MET - worst pain 3/10 (isolated incident of falling backwards into shoulder and the pain went up to a 5/10 in the back  Goal: Pt to report FOTO score of 58 to show improved function and quality of life. Status at last note/certification: FOTO 55 pts   Current: Not MET - progressed - FOTO 57pts    Assessment/ Summary of Care: Pt has attended 8 PT sessions for LBP and right shoulder pain. Pt has met all short term and most long term goals with skilled therapy. Patient demonstrates improvement with strength, ROM, and pain levels. Patient's subjective and 57 points improvement score indicate functional improvement with skilled therapy. Patient is discharged from skilled physical therapy and transitioned to independent home program at this time.      RECOMMENDATIONS:  [x]Discontinue therapy: [x]Patient has reached or is progressing toward set goals      []Patient is non-compliant or has abdicated      []Due to lack of appreciable progress towards set [address], PT 10/27/2022 12:50 PM

## 2023-02-24 NOTE — PROGRESS NOTES
"  Problem: Adult Inpatient Plan of Care  Goal: Plan of Care Review  Outcome: Ongoing, Progressing  Goal: Patient-Specific Goal (Individualized)  Outcome: Ongoing, Progressing  Flowsheets (Taken 2023)  Individualized Care Needs: "I want to go home tomorrow."  Goal: Absence of Hospital-Acquired Illness or Injury  Outcome: Ongoing, Progressing  Goal: Optimal Comfort and Wellbeing  Outcome: Ongoing, Progressing  Goal: Readiness for Transition of Care  Outcome: Ongoing, Progressing     Problem: Bariatric Environmental Safety  Goal: Safety Maintained with Care  Outcome: Ongoing, Progressing     Problem:  Fall Injury Risk  Goal: Absence of Fall, Infant Drop and Related Injury  Outcome: Ongoing, Progressing     Problem: Infection  Goal: Absence of Infection Signs and Symptoms  Outcome: Ongoing, Progressing     Problem: Pain Acute  Goal: Acceptable Pain Control and Functional Ability  Outcome: Ongoing, Progressing     Problem: Breastfeeding  Goal: Effective Breastfeeding  Outcome: Ongoing, Progressing     " PT DAILY TREATMENT NOTE     Patient Name: Natalia Ham  Date:2018  : 1960  [x]  Patient  Verified  Payor: BLUE CROSS / Plan: [facility] / Product Type: PPO /    In time:800  Out time:845  Total Treatment Time (min): 45  (Total 1:1 Time: 39)  Visit #: 1 of 10    Treatment Area: Low back pain [M54.5]  Right hip pain [M25.551]    SUBJECTIVE  Pain Level (0-10 scale): 3  Any medication changes, allergies to medications, adverse drug reactions, diagnosis change, or new procedure performed?: [x] No    [] Yes (see summary sheet for update)  Subjective functional status/changes:   [] No changes reported      OBJECTIVE    Modality rationale:    Min Type Additional Details    [] Estim:  []Unatt       []IFC  []Premod                        []Other:  []w/ice   []w/heat  Position:  Location:    [] Estim: []Att    []TENS instruct  []NMES                    []Other:  []w/US   []w/ice   []w/heat  Position:  Location:    []  Traction: [] Cervical       []Lumbar                       [] Prone          []Supine                       []Intermittent   []Continuous Lbs:  [] before manual  [] after manual    []  Ultrasound: []Continuous   [] Pulsed                           []1MHz   []3MHz W/cm2:  Location:    []  Iontophoresis with dexamethasone         Location: [] Take home patch   [] In clinic    []  Ice     []  heat  []  Ice massage  []  Laser   []  Anodyne Position:  Location:    []  Laser with stim  []  Other:  Position:  Location:    []  Vasopneumatic Device Pressure:       [] lo [] med [] hi   Temperature: [] lo [] med [] hi   [] Skin assessment post-treatment:  []intact []redness- no adverse reaction    []redness - adverse reaction:     37 min [x]Eval                  []Re-Eval       8 min Therapeutic Exercise:  [x] See flow sheet :   Rationale: increase ROM and increase strength to improve the patients ability to improve ease of daily tasks         With   [] TE   [] TA   [] neuro   [] other: Patient Education: [x] Review HEP    [] Progressed/Changed HEP based on:   [] positioning   [] body mechanics   [] transfers   [] heat/ice application    [] other:      Other Objective/Functional Measures:      Pain Level (0-10 scale) post treatment: 3    ASSESSMENT/Changes in Function:     Patient will continue to benefit from skilled PT services to modify and progress therapeutic interventions, address functional mobility deficits, address ROM deficits, address strength deficits, analyze and address soft tissue restrictions, analyze and cue movement patterns, analyze and modify body mechanics/ergonomics, assess and modify postural abnormalities, address imbalance/dizziness and instruct in home and community integration to attain remaining goals.      [x]  See Plan of Care  []  See progress note/recertification  []  See Discharge Summary         Progress towards goals / Updated goals:  See POC    PLAN  []  Upgrade activities as tolerated     [x]  Continue plan of care  []  Update interventions per flow sheet       []  Discharge due to:_  []  Other:_      Aly Jara, [facility]  Shereen Sterling, PT 7/11/2018  8:53 AM    Future Appointments  Date Time Provider Colleen Red   7/12/2018 5:00 PM Anisha Miranda, PT [facility]   7/17/2018 5:00 PM Anisha Miranda, [facility]   7/19/2018 5:00 PM Anisha Miranda, [facility]   7/24/2018 5:00 PM Anisha Miranda, [facility]   7/26/2018 5:00 PM Anisha Miranda, [facility]   7/31/2018 5:00 PM Wendy Miranda, PT [facility]

## 2024-03-22 ENCOUNTER — HOSPITAL ENCOUNTER (OUTPATIENT)
Facility: HOSPITAL | Age: 64
Setting detail: RECURRING SERIES
Discharge: HOME | End: 2024-03-25
Payer: COMMERCIAL

## 2024-03-22 PROCEDURE — 97162 PT EVAL MOD COMPLEX 30 MIN: CPT

## 2024-03-22 PROCEDURE — 97140 MANUAL THERAPY 1/> REGIONS: CPT

## 2024-03-22 PROCEDURE — 97110 THERAPEUTIC EXERCISES: CPT

## 2024-03-22 NOTE — PROGRESS NOTES
[facility]  [address], [place] [address] Phone: [phone] Fax [phone]  Plan of Care / Statement of Necessity for Physical Therapy Services     Patient Name: Jacqueline Mota : 1960   Treatment   Diagnosis: M25.551  RIGHT HIP PAIN  and M54.59  OTHER LOWER BACK PAIN Medical Diagnosis: Other low back pain [M54.59]   Onset Date: 24 - referral date Payor Source: Payor: BCBS / Plan: [employer] FEP / Product Type: *No Product type* /    Referral Source: Tangela Healy MD Start of Care (SOC): 3/22/2024   Prior Hospitalization: See medical history Provider #: [id number]   Prior Level of Function: Previously functionally independent, able to walk her dog for prolonged periods, participated in [employer] activities without difficulty, Lives in a 1 story home with 3 stairs to enter with rails, Retired    Comorbidities: HTN, hx of depression, lupus, hx of migraines, lumbar fusion x2 (L3-L5 in , and L5-S1 in ), gll bladder removal, hysterectomy     Assessment / key information:  Pt is a 63 y.o. female who presents with c/o acute onset of right sided low back and hip pain. Symptoms include intermittent thigh pain and right foot N/T; pt feels some relief with prolonged standing but cannot tolerate prolonged sitting. Functional deficits include: difficulty with prolonged walking, requires UE support with squatting, limited tolerance for prolonged sitting, reduced ability to perform moderate to heavy household work, difficulty with stair negotiation, and difficulty getting up from seated surfaces. Upon exam, pt exhibited reduced lumbar AROM, decreased bilateral LE strength and right LE flexibility, gait deviations on even surfaces and stairs, impaired squat mechanics, and TTP at right gluteus mm., distal right lumbar paraspinals, ischial tuberosity, hamstrings mm., lumbar/sacrum with PA pressure. Patient scored 59

## 2024-03-22 NOTE — PROGRESS NOTES
PT DAILY TREATMENT NOTE/LUMBAR EVAL     Patient Name: Jacqueline Mota    Date: 3/22/2024    : 1960  Insurance: Payor: MEHDI / Plan: [employer] FEP / Product Type: *No Product type* /      Patient  verified yes     Visit #   Current / Total 1 12   Time   In / Out 2:48 3:37   Pain   In / Out 4 2   Subjective Functional Status/Changes: See Subjective Summary       Treatment Area: Other low back pain [M54.59]  SUBJECTIVE    Subjective functional status/changes:     Chief Complaint: right sided low back and right hip pain  History/Mechanism of Injury: Acute on 24 upon waking  Current Symptoms/Deficits: difficulty with prolonged walking, requires UE support with squatting, limited tolerance for prolonged sitting, reduced ability to perform moderate to heavy household work, difficulty with stair negotiation, and difficulty getting up from seated surfaces   Pain-  Current: 4/10     Worst: 8/10   Best: 3/10  Previous Treatment/Compliance: previous episodes of PT for LBP, injections for hips  Mobility Devices: none reported  PMHx/Surgical Hx: HTN, hx of depression, lupus, hx of migraines, lumbar fusion x2 (L3-L5 in , and L5-S1 in ), gll bladder removal, hysterectomy    Diagnostic Tests: [] Lab work [] X-rays    [] CT [] MRI     [] Other:  Results:    Work Hx: Retired  Living Situation: Lives in a 1 story home with 3 stairs to enter with rails  Household Modifications: shower rails, with permanent bench, high toilets  Hobbies: Walking with her puppy, [employer] activities, crafting, reading  PLOF: Previously functionally independent, able to walk her dog for prolonged periods, participated in [employer] activities without difficulty  Pt Goals: \"to be pain free\"    General Health:  Red Flags Indicated? [] Yes    [x] No  [] Yes [x] No Recent weight change (If yes, due to dieting? [] Yes  [] No)   [] Yes [x] No Weakness in legs during walking  [x] Yes [] No Unremitting pain at night - right hip/low back/

## 2024-03-28 ENCOUNTER — HOSPITAL ENCOUNTER (OUTPATIENT)
Facility: HOSPITAL | Age: 64
Setting detail: RECURRING SERIES
End: 2024-03-28
Payer: COMMERCIAL

## 2024-04-04 ENCOUNTER — HOSPITAL ENCOUNTER (OUTPATIENT)
Facility: HOSPITAL | Age: 64
Setting detail: RECURRING SERIES
Discharge: HOME | End: 2024-04-07
Payer: COMMERCIAL

## 2024-04-04 PROCEDURE — 97110 THERAPEUTIC EXERCISES: CPT

## 2024-04-04 PROCEDURE — 97530 THERAPEUTIC ACTIVITIES: CPT

## 2024-04-04 NOTE — PROGRESS NOTES
symptoms of > 75% to increase participation in recreational activities.  Status at last note/certification: pt reports having to leave [facility] early because of severe pain  4- Goal: Pt to report < 3/10 pain at worst to increase ease with ADLs.  Status at last note/certification: 8/10 pain at worst  5- Goal: Pt to report FOTO score of 59 to show improved function and quality of life.  Status at last note/certification: FOTO 61 pts      Next PN/ RC due 4/20/2024  Auth due (visit number/ date) DANIELA    PLAN  - Continue Plan of Care    Herber Garcia PTA    4/4/2024    9:01 AM    Future Appointments   Date Time Provider Department Center   4/11/2024  9:00 AM Herber Garcia PTA [facility]   4/18/2024  9:00 AM [facility] PT [facility]   4/24/2024  9:00 AM Latha Rivers, PT [facility]

## 2024-04-11 ENCOUNTER — HOSPITAL ENCOUNTER (OUTPATIENT)
Facility: HOSPITAL | Age: 64
Setting detail: RECURRING SERIES
Discharge: HOME | End: 2024-04-14
Payer: COMMERCIAL

## 2024-04-11 PROCEDURE — 97530 THERAPEUTIC ACTIVITIES: CPT

## 2024-04-11 PROCEDURE — 97112 NEUROMUSCULAR REEDUCATION: CPT

## 2024-04-11 PROCEDURE — 97110 THERAPEUTIC EXERCISES: CPT

## 2024-04-11 NOTE — PROGRESS NOTES
PHYSICAL / OCCUPATIONAL THERAPY - DAILY TREATMENT NOTE    Patient Name: Jacqueline Mota    Date: 2024    : 1960  Insurance: Payor: MEHDI / Plan: [employer] FEP / Product Type: *No Product type* /      Patient  verified Yes     Visit #   Current / Total 3 12   Time   In / Out 9:00 9:39   Pain   In / Out 2-3/10 1/10   Subjective Functional Status/Changes: My right side is a little tight and sore. I felt pretty good after my last session.      TREATMENT AREA =  Other low back pain [M54.59]    OBJECTIVE         Therapeutic Procedures:    Tx Min Billable or 1:1 Min (if diff from Tx Min) Procedure, Rationale, Specifics   14 14 78982 Therapeutic Exercise (timed):  increase ROM, strength, coordination, balance, and proprioception to improve patient's ability to progress to PLOF and address remaining functional goals. (see flow sheet as applicable)     Details if applicable:       15 15 33357 Neuromuscular Re-Education (timed):  improve balance, coordination, kinesthetic sense, posture, core stability and proprioception to improve patient's ability to develop conscious control of individual muscles and awareness of position of extremities in order to progress to PLOF and address remaining functional goals. (see flow sheet as applicable)     Details if applicable:     10 10 39113 Therapeutic Activity (timed):  use of dynamic activities replicating functional movements to increase ROM, strength, coordination, balance, and proprioception in order to improve patient's ability to progress to PLOF and address remaining functional goals.  (see flow sheet as applicable)     Details if applicable:               39 39 [facility] Totals Reminder: bill using total billable min of TIMED therapeutic procedures (example: do not include dry needle or estim unattended, both untimed codes, in totals to left)  8-22 min = 1 unit; 23-37 min = 2 units; 38-52 min = 3 units; 53-67 min = 4 units; 68-82 min = 5 units   Total Total

## 2024-04-18 ENCOUNTER — HOSPITAL ENCOUNTER (OUTPATIENT)
Facility: HOSPITAL | Age: 64
Setting detail: RECURRING SERIES
Discharge: HOME | End: 2024-04-21
Payer: COMMERCIAL

## 2024-04-18 PROCEDURE — 97530 THERAPEUTIC ACTIVITIES: CPT

## 2024-04-18 PROCEDURE — 97110 THERAPEUTIC EXERCISES: CPT

## 2024-04-18 NOTE — PROGRESS NOTES
[facility] - [facility] AT [facility]   [address], [place] [address]  Phone: [phone] Fax: [phone]  PROGRESS NOTE  Patient Name: Jacqueline Mota : 1960   Treatment/Medical Diagnosis: Other low back pain [M54.59]   Referral Source: Tangela Healy MD     Payor: Payor: MEHDI / Plan: BR[employer] FEP / Product Type: *No Product type* /            Date of Initial Visit: 3/22/2024 Attended Visits: 4 Missed Visits: 0       CURRENT GOAL STATUS    Short Term Goals: To be accomplished in 4 treatments:  1- Goal: Pt to be compliant with initial HEP to improve performance of ADLs.  Status at last note/certification: Established and reviewed with pt   Current: Met:  Pt compliant with HEP. (2024)  Long Term Goals: To be accomplished in 12 treatments:  1- Goal: Pt to improve bilateral LE strength to > 4+/5 without pain to improve prolonged walking tolerance.  Status at last note/certification:    Strength: Right (/5) Left (/5)   Hip     Flexion 4- p! 4             Abduction 4- 4             Adduction 3 3+             Extension 3+ 3+             ER 4 4+             IR 4- 4    Current:  progressing (24)   Strength: Right (/5) Left (/5)   Hip     Flexion 4 4             Abduction 4 4+             Adduction 4 4             Extension 4 4             ER 4+ 4+             IR 4 4      2- Goal: Pt to report improved stair negotiation to reciprocal pattern with < 1 UE support without increased pain to improve accessibility to the home environment.  Status at last note/certification: Step to pattern with bilateral UE support, leads with left ascending/descending   Current: progressing: step to pattern with bilateral UE assistance with ascent/ descent. Increased in right sided/ lower back pain. (2024)  3- Goal: Pt to report improvement in low back and right hip/LE symptoms of > 75% to increase participation in recreational activities.  Status at last

## 2024-04-18 NOTE — PROGRESS NOTES
PHYSICAL / OCCUPATIONAL THERAPY - DAILY TREATMENT NOTE    Patient Name: Jacqueline Mota    Date: 2024    : 1960  Insurance: Payor: MEHDI / Plan: BR[employer] FEP / Product Type: *No Product type* /      Patient  verified Yes     Visit #   Current / Total 4 12   Time   In / Out 9:00 9:40   Pain   In / Out 4/10 3/10   Subjective Functional Status/Changes: Pt reports having moderate pain in her low back and pain extending down her right LE to her foot. She rates overall improvement at 60%     TREATMENT AREA =  Other low back pain [M54.59]    OBJECTIVE         Therapeutic Procedures:    Tx Min Billable or 1:1 Min (if diff from Tx Min) Procedure, Rationale, Specifics   30 30 74278 Therapeutic Exercise (timed):  increase ROM, strength, coordination, balance, and proprioception to improve patient's ability to progress to PLOF and address remaining functional goals. (see flow sheet as applicable)     Details if applicable:       0 0 67276 Neuromuscular Re-Education (timed):  improve balance, coordination, kinesthetic sense, posture, core stability and proprioception to improve patient's ability to develop conscious control of individual muscles and awareness of position of extremities in order to progress to PLOF and address remaining functional goals. (see flow sheet as applicable)     Details if applicable:     10 10 53290 Therapeutic Activity (timed):  use of dynamic activities replicating functional movements to increase ROM, strength, coordination, balance, and proprioception in order to improve patient's ability to progress to PLOF and address remaining functional goals.  (see flow sheet as applicable)     Details if applicable:               4o 40 [facility] Totals Reminder: bill using total billable min of TIMED therapeutic procedures (example: do not include dry needle or estim unattended, both untimed codes, in totals to left)  8-22 min = 1 unit; 23-37 min = 2 units; 38-52 min = 3 units; 53-67 min = 4

## 2024-04-24 ENCOUNTER — HOSPITAL ENCOUNTER (OUTPATIENT)
Facility: HOSPITAL | Age: 64
Setting detail: RECURRING SERIES
Discharge: HOME | End: 2024-04-27
Payer: COMMERCIAL

## 2024-04-24 PROCEDURE — 97110 THERAPEUTIC EXERCISES: CPT

## 2024-04-24 PROCEDURE — 97530 THERAPEUTIC ACTIVITIES: CPT

## 2024-04-24 PROCEDURE — 97112 NEUROMUSCULAR REEDUCATION: CPT

## 2024-04-24 NOTE — PROGRESS NOTES
PHYSICAL / OCCUPATIONAL THERAPY - DAILY TREATMENT NOTE    Patient Name: Jacqueline Mota    Date: 2024    : 1960  Insurance: Payor: MEHDI / Plan: [employer] FEP / Product Type: *No Product type* /      Patient  verified Yes     Visit #   Current / Total 1 8   Time   In / Out 2:03 2:42   Pain   In / Out 2.5 0   Subjective Functional Status/Changes: I feel like the ex's are helping     TREATMENT AREA =  Other low back pain [M54.59]    OBJECTIVE      Therapeutic Procedures:    Tx Min Billable or 1:1 Min (if diff from Tx Min) Procedure, Rationale, Specifics   11 11 53534 Therapeutic Exercise (timed):  increase ROM, strength, coordination, balance, and proprioception to improve patient's ability to progress to PLOF and address remaining functional goals. (see flow sheet as applicable)     Details if applicable:        19750 Neuromuscular Re-Education (timed):  improve balance, coordination, kinesthetic sense, posture, core stability and proprioception to improve patient's ability to develop conscious control of individual muscles and awareness of position of extremities in order to progress to PLOF and address remaining functional goals. (see flow sheet as applicable)     Details if applicable:     10 10 31508 Therapeutic Activity (timed):  use of dynamic activities replicating functional movements to increase ROM, strength, coordination, balance, and proprioception in order to improve patient's ability to progress to PLOF and address remaining functional goals.  (see flow sheet as applicable)     Details if applicable:            Details if applicable:            Details if applicable:     39 39 [facility] Totals Reminder: bill using total billable min of TIMED therapeutic procedures (example: do not include dry needle or estim unattended, both untimed codes, in totals to left)  8-22 min = 1 unit; 23-37 min = 2 units; 38-52 min = 3 units; 53-67 min = 4 units; 68-82 min = 5 units   Total Total     [x]

## 2024-05-02 ENCOUNTER — HOSPITAL ENCOUNTER (OUTPATIENT)
Facility: HOSPITAL | Age: 64
Setting detail: RECURRING SERIES
Discharge: HOME | End: 2024-05-05
Payer: COMMERCIAL

## 2024-05-02 PROCEDURE — 97140 MANUAL THERAPY 1/> REGIONS: CPT

## 2024-05-02 PROCEDURE — 97530 THERAPEUTIC ACTIVITIES: CPT

## 2024-05-02 PROCEDURE — 97112 NEUROMUSCULAR REEDUCATION: CPT

## 2024-05-02 NOTE — PROGRESS NOTES
PHYSICAL / OCCUPATIONAL THERAPY - DAILY TREATMENT NOTE    Patient Name: Jacqueline Mota    Date: 2024    : 1960  Insurance: Payor: MEHDI / Plan: [employer] FEP / Product Type: *No Product type* /      Patient  verified Yes     Visit #   Current / Total 2 8   Time   In / Out 7:44 8:22   Pain   In / Out 2/10 1/10   Subjective Functional Status/Changes: I'm having some pain on the side of my right hip.      TREATMENT AREA =  Other low back pain [M54.59]    OBJECTIVE         Therapeutic Procedures:    Tx Min Billable or 1:1 Min (if diff from Tx Min) Procedure, Rationale, Specifics   8 8 61344 Manual Therapy (timed):  decrease pain, increase ROM, increase tissue extensibility, and decrease trigger points to improve patient's ability to progress to PLOF and address remaining functional goals.  The manual therapy interventions were performed at a separate and distinct time from the therapeutic activities interventions . (see flow sheet as applicable)     Details if applicable:  Supine: Pelvic shotgun/ clearing, Long axis traction to the left LE, MET to correct left posterior/ right anterior innominate. Leg length assessment/ reassessment.      18 18 99755 Neuromuscular Re-Education (timed):  improve balance, coordination, kinesthetic sense, posture, core stability and proprioception to improve patient's ability to develop conscious control of individual muscles and awareness of position of extremities in order to progress to PLOF and address remaining functional goals. (see flow sheet as applicable)     Details if applicable:     12 12 06132 Therapeutic Activity (timed):  use of dynamic activities replicating functional movements to increase ROM, strength, coordination, balance, and proprioception in order to improve patient's ability to progress to PLOF and address remaining functional goals.  (see flow sheet as applicable)     Details if applicable:               38 38  BC Totals Reminder: bill using

## 2024-05-09 ENCOUNTER — HOSPITAL ENCOUNTER (OUTPATIENT)
Facility: HOSPITAL | Age: 64
Setting detail: RECURRING SERIES
Discharge: HOME | End: 2024-05-12
Payer: COMMERCIAL

## 2024-05-09 PROCEDURE — 97530 THERAPEUTIC ACTIVITIES: CPT

## 2024-05-09 PROCEDURE — 97140 MANUAL THERAPY 1/> REGIONS: CPT

## 2024-05-09 PROCEDURE — 97112 NEUROMUSCULAR REEDUCATION: CPT

## 2024-05-09 NOTE — PROGRESS NOTES
PHYSICAL / OCCUPATIONAL THERAPY - DAILY TREATMENT NOTE    Patient Name: Jacqueline Mota    Date: 2024    : 1960  Insurance: Payor: MEHDI / Plan: BR[employer]ARDING FEP / Product Type: *No Product type* /      Patient  verified Yes     Visit #   Current / Total 3 8   Time   In / Out 9:00 am 9:39 am   Pain   In / Out 3/10 0/10   Subjective Functional Status/Changes: \"I've been having some cramping in my right buttock and low back.  I don't know what caused it.\"      TREATMENT AREA =  Other low back pain [M54.59]    OBJECTIVE         Therapeutic Procedures:    Tx Min Billable or 1:1 Min (if diff from Tx Min) Procedure, Rationale, Specifics   10 10 62820 Manual Therapy (timed):  decrease pain, increase ROM, increase tissue extensibility, and decrease trigger points to improve patient's ability to progress to PLOF and address remaining functional goals.  The manual therapy interventions were performed at a separate and distinct time from the therapeutic activities interventions . (see flow sheet as applicable)     Details if applicable:  S/L STM, TPR to right piriformis ms.      21  31110 Neuromuscular Re-Education (timed):  improve balance, coordination, kinesthetic sense, posture, core stability and proprioception to improve patient's ability to develop conscious control of individual muscles and awareness of position of extremities in order to progress to PLOF and address remaining functional goals. (see flow sheet as applicable)     Details if applicable:     8 8 25798 Therapeutic Activity (timed):  use of dynamic activities replicating functional movements to increase ROM, strength, coordination, balance, and proprioception in order to improve patient's ability to progress to PLOF and address remaining functional goals.  (see flow sheet as applicable)     Details if applicable:               39 39 [facility] Totals Reminder: bill using total billable min of TIMED therapeutic procedures (example: do not

## 2024-05-16 ENCOUNTER — APPOINTMENT (OUTPATIENT)
Facility: HOSPITAL | Age: 64
End: 2024-05-16
Payer: COMMERCIAL

## 2024-05-23 ENCOUNTER — HOSPITAL ENCOUNTER (OUTPATIENT)
Facility: HOSPITAL | Age: 64
Setting detail: RECURRING SERIES
Discharge: HOME | End: 2024-05-26
Payer: COMMERCIAL

## 2024-05-23 PROCEDURE — 97530 THERAPEUTIC ACTIVITIES: CPT

## 2024-05-23 PROCEDURE — 97112 NEUROMUSCULAR REEDUCATION: CPT

## 2024-05-23 NOTE — PROGRESS NOTES
[facility] - [facility] AT [facility]   [address], [place] [address]  Phone: [phone] Fax: [phone]  PROGRESS NOTE  Patient Name: Jacqueline Mota : 1960   Treatment/Medical Diagnosis: Other low back pain [M54.59]   Referral Source: Tangela Healy MD     Payor: Payor: MEHDI / Plan: BR[employer] FEP / Product Type: *No Product type* /            Date of Initial Visit: 2024 Attended Visits: 8 Missed Visits: 0       CURRENT GOAL STATUS  Long Term Goals: To be accomplished in 8 treatments:  1- Goal: Pt to improve bilateral LE strength to > 4+/5 without pain to improve prolonged walking tolerance.  Status at last note/certification: progressing (24)   Strength: Right (/5) Left (/5)   Hip     Flexion 4 4             Abduction 4 4+             Adduction 4 4             Extension 4 4             ER 4+ 4+             IR 4 4       Current:  progressing: (2024)   Strength: Right (/5) Left (/5)   Hip     Flexion 4 4+             Abduction 4+ 4+             Adduction 4 4             Extension 4 4+             ER 4+ 4+             IR 4+ 4+     2- Goal: Pt to report improved stair negotiation to reciprocal pattern with < 1 UE support without increased pain to improve accessibility to the home environment.  Status at last note/certification:  progressing: step to pattern with bilateral UE assistance with ascent/ descent. Increased in right sided/ lower back pain. (2024)   Current: progressing: alternating between step to/ step through pattern, bilateral UE assistance. (2024)    3- Goal: Pt to report improvement in low back and right hip/LE symptoms of > 75% to increase participation in recreational activities.  Status at last note/certification: Pt reports 60% improvement in symptoms (24)  Current: met: 75 percent. ()  4- Goal: Pt to report < 3/10 pain at worst to increase ease with ADLs.  Status at last

## 2024-05-23 NOTE — PROGRESS NOTES
PHYSICAL / OCCUPATIONAL THERAPY - DAILY TREATMENT NOTE    Patient Name: Jacqueline Mota    Date: 2024    : 1960  Insurance: Payor: MEHDI / Plan: BR[employer] FEP / Product Type: *No Product type* /      Patient  verified Yes     Visit #   Current / Total 4 8   Time   In / Out 9:09 9:40   Pain   In / Out 2/10 0/10   Subjective Functional Status/Changes: I'm a little stiff after sitting in the dentist chair this morning.     TREATMENT AREA =  Other low back pain [M54.59]     OBJECTIVE         Therapeutic Procedures:    Tx Min Billable or 1:1 Min (if diff from Tx Min) Procedure, Rationale, Specifics   18 18 81065 Therapeutic Activity (timed):  use of dynamic activities replicating functional movements to increase ROM, strength, coordination, balance, and proprioception in order to improve patient's ability to progress to PLOF and address remaining functional goals.  (see flow sheet as applicable)     Details if applicable:        64521 Neuromuscular Re-Education (timed):  improve balance, coordination, kinesthetic sense, posture, core stability and proprioception to improve patient's ability to develop conscious control of individual muscles and awareness of position of extremities in order to progress to PLOF and address remaining functional goals. (see flow sheet as applicable)     Details if applicable:                     [facility] Totals Reminder: bill using total billable min of TIMED therapeutic procedures (example: do not include dry needle or estim unattended, both untimed codes, in totals to left)  8-22 min = 1 unit; 23-37 min = 2 units; 38-52 min = 3 units; 53-67 min = 4 units; 68-82 min = 5 units   Total Total     [x]  Patient Education billed concurrently with other procedures   [x] Review HEP    [] Progressed/Changed HEP, detail:    [] Other detail:       Objective Information/Functional Measures/Assessment    Goals assess at the start of session with patient making progress in all

## 2024-05-29 ENCOUNTER — HOSPITAL ENCOUNTER (OUTPATIENT)
Facility: HOSPITAL | Age: 64
Setting detail: RECURRING SERIES
Discharge: HOME | End: 2024-06-01
Payer: COMMERCIAL

## 2024-05-29 PROCEDURE — 97530 THERAPEUTIC ACTIVITIES: CPT

## 2024-05-29 PROCEDURE — 97112 NEUROMUSCULAR REEDUCATION: CPT

## 2024-05-29 NOTE — PROGRESS NOTES
PHYSICAL / OCCUPATIONAL THERAPY - DAILY TREATMENT NOTE    Patient Name: Jacqueline Mota    Date: 2024    : 1960  Insurance: Payor: MEHDI / Plan: BR[employer]ING FEP / Product Type: *No Product type* /      Patient  verified Yes     Visit #   Current / Total 1 4   Time   In / Out 9:44 am 10:16 am   Pain   In / Out 1/10 010   Subjective Functional Status/Changes: \"I feel pretty good, actually.\"     TREATMENT AREA =  Other low back pain [M54.59]     OBJECTIVE         Therapeutic Procedures:    Tx Min Billable or 1:1 Min (if diff from Tx Min) Procedure, Rationale, Specifics   24 24 51953 Therapeutic Activity (timed):  use of dynamic activities replicating functional movements to increase ROM, strength, coordination, balance, and proprioception in order to improve patient's ability to progress to PLOF and address remaining functional goals.  (see flow sheet as applicable)     Details if applicable:       8 8 33152 Neuromuscular Re-Education (timed):  improve balance, coordination, kinesthetic sense, posture, core stability and proprioception to improve patient's ability to develop conscious control of individual muscles and awareness of position of extremities in order to progress to PLOF and address remaining functional goals. (see flow sheet as applicable)     Details if applicable:                    32 32 [facility] Totals Reminder: bill using total billable min of TIMED therapeutic procedures (example: do not include dry needle or estim unattended, both untimed codes, in totals to left)  8-22 min = 1 unit; 23-37 min = 2 units; 38-52 min = 3 units; 53-67 min = 4 units; 68-82 min = 5 units   Total Total     [x]  Patient Education billed concurrently with other procedures   [x] Review HEP    [] Progressed/Changed HEP, detail:    [] Other detail:       Objective Information/Functional Measures/Assessment    Pt is steadily progressing with skilled therapy measures, reporting continued reduction in overall pain

## 2024-06-05 ENCOUNTER — APPOINTMENT (OUTPATIENT)
Facility: HOSPITAL | Age: 64
End: 2024-06-05
Payer: COMMERCIAL

## 2024-06-06 ENCOUNTER — HOSPITAL ENCOUNTER (OUTPATIENT)
Facility: HOSPITAL | Age: 64
Setting detail: RECURRING SERIES
Discharge: HOME | End: 2024-06-09
Payer: COMMERCIAL

## 2024-06-06 PROCEDURE — 97530 THERAPEUTIC ACTIVITIES: CPT

## 2024-06-06 PROCEDURE — 97110 THERAPEUTIC EXERCISES: CPT

## 2024-06-06 NOTE — PROGRESS NOTES
PHYSICAL / OCCUPATIONAL THERAPY - DAILY TREATMENT NOTE    Patient Name: Jacqueline Mota    Date: 2024    : 1960  Insurance: Payor: MEHDI / Plan: [employer] FEP / Product Type: *No Product type* /      Patient  verified Yes     Visit #   Current / Total 2 4   Time   In / Out 09:40 10:20 am   Pain   In / Out 3/10 0/10   Subjective Functional Status/Changes: Pt reports that she has mild pain this morning. She feels that pain is a little heightened after traveling the last week.      TREATMENT AREA =  Other low back pain [M54.59]     OBJECTIVE         Therapeutic Procedures:    Tx Min Billable or 1:1 Min (if diff from Tx Min) Procedure, Rationale, Specifics   15 15 17492 Therapeutic Activity (timed):  use of dynamic activities replicating functional movements to increase ROM, strength, coordination, balance, and proprioception in order to improve patient's ability to progress to PLOF and address remaining functional goals.  (see flow sheet as applicable)     Details if applicable:       25 25 35530 Therapeutic Exercise (timed):  increase ROM, strength, coordination, balance, and proprioception to improve patient's ability to progress to PLOF and address remaining functional goals. (see flow sheet as applicable)     Details if applicable:                    40 40 [facility] Totals Reminder: bill using total billable min of TIMED therapeutic procedures (example: do not include dry needle or estim unattended, both untimed codes, in totals to left)  8-22 min = 1 unit; 23-37 min = 2 units; 38-52 min = 3 units; 53-67 min = 4 units; 68-82 min = 5 units   Total Total     [x]  Patient Education billed concurrently with other procedures   [x] Review HEP    [] Progressed/Changed HEP, detail:    [] Other detail:       Objective Information/Functional Measures/Assessment    Pt continues to display an extension preference for movement. She was able to tolerate a progression go strengthening program. Pt is making good

## 2024-06-12 ENCOUNTER — HOSPITAL ENCOUNTER (OUTPATIENT)
Facility: HOSPITAL | Age: 64
Setting detail: RECURRING SERIES
Discharge: HOME | End: 2024-06-15
Payer: COMMERCIAL

## 2024-06-12 PROCEDURE — 97140 MANUAL THERAPY 1/> REGIONS: CPT

## 2024-06-12 PROCEDURE — 97110 THERAPEUTIC EXERCISES: CPT

## 2024-06-19 ENCOUNTER — HOSPITAL ENCOUNTER (OUTPATIENT)
Facility: HOSPITAL | Age: 64
Setting detail: RECURRING SERIES
Discharge: HOME | End: 2024-06-22
Payer: COMMERCIAL

## 2024-06-19 PROCEDURE — 97530 THERAPEUTIC ACTIVITIES: CPT

## 2024-06-19 PROCEDURE — 97110 THERAPEUTIC EXERCISES: CPT

## 2024-06-19 NOTE — PROGRESS NOTES
PHYSICAL THERAPY - DISCHARGE DAILY NOTE AND SUMMARY (updated )    Patient Name: Jacqueline Mota : 1960   Treatment/Medical Diagnosis: Other low back pain [M54.59]   Referral Source: Tangela Healy MD     Payor: Payor: MEHDI / Plan: BR[employer] FEP / Product Type: *No Product type* /            Reporting Period : 2024 to 2024    Date: 2024    Patient  verified yes     Visit #   Current / Total 4 4   Time   In / Out 9:45 10:23   Pain   In / Out 4 1   Subjective Functional Status/Changes: I'm having the pain in my right hip I tried the adjustment Ailin showed me, it did help, but not resolved.     TREATMENT AREA =  Other low back pain [M54.59]    OBJECTIVE      Therapeutic Procedures:  Tx Min Billable or 1:1 Min (if diff from Tx Min) Procedure, Rationale, Specifics   25 25 21663 Therapeutic Activity (timed):  use of dynamic activities replicating functional movements to increase ROM, strength, coordination, balance, and proprioception in order to improve patient's ability to progress to PLOF and address remaining functional goals.  (see flow sheet as applicable)     Details if applicable:      08977 Neuromuscular Re-Education (timed):  improve balance, coordination, kinesthetic sense, posture, core stability and proprioception to improve patient's ability to develop conscious control of individual muscles and awareness of position of extremities in order to progress to PLOF and address remaining functional goals. (see flow sheet as applicable)     Details if applicable:            Details if applicable:            Details if applicable:            Details if applicable:     38 38 [facility] Totals Reminder: bill using total billable min of TIMED therapeutic procedures (example: do not include dry needle or estim unattended, both untimed codes, in totals to left)  8-22 min = 1 unit; 23-37 min = 2 units; 38-52 min = 3 units; 53-67 min = 4 units; 68-82 min = 5 units   Total Total

## 2024-06-19 NOTE — PROGRESS NOTES
Physical Therapy Discharge Instructions      [facility]  [address] [place] [address] [phone] fax      Patient: Jacqueline Mota  : 1960      Continue Home Exercise Program 1-2 times per day for 4 weeks, then decrease to 2-3 times per week      Continue with    [] Ice  as needed 0 times per day     [] Heat           Follow up with MD:     [] Upon completion of therapy     [x] As needed      Recommendations:     []   Return to activity with home program    []   Return to activity with the following modifications:       []Post Rehab Program    []Join Independent aquatic program     []Return to/join local gym      Additional Comments:           Sandra Jaquez, PTA 2024 10:09 AM

## 2025-06-03 ENCOUNTER — HOSPITAL ENCOUNTER (OUTPATIENT)
Facility: HOSPITAL | Age: 65
Setting detail: RECURRING SERIES
Discharge: HOME | End: 2025-06-06
Payer: MEDICARE

## 2025-06-03 PROCEDURE — 97161 PT EVAL LOW COMPLEX 20 MIN: CPT

## 2025-06-03 PROCEDURE — 97110 THERAPEUTIC EXERCISES: CPT

## 2025-06-03 NOTE — PROGRESS NOTES
PT DAILY TREATMENT NOTE/HIP [id number]      Patient Name: Jacqueline Mota    Date: 6/3/2025    : 1960  Insurance: Payor: MEDICARE / Plan: MEDICARE PART A AND B / Product Type: *No Product type* /      Patient  verified yes     Visit #   Current / Total 1 10   Time   In / Out 1130 1210   Pain   In / Out 2/10 2/10   Subjective Functional Status/Changes: Pt arrives to PT IE with c/o B hip pain.      Treatment Area: Pain in unspecified hip [M25.559]  Left hip pain [M25.552]  Right hip pain [M25.551]  If an interpreting service is utilized for treatment of this patient, the contents of this document represent the material reviewed with the patient via the .     SUBJECTIVE  Any medication changes, allergies to medications, adverse drug reactions, diagnosis change, or new procedure performed?: [x] No    [] Yes (see summary sheet for update)    Subjective Info:  HPI: Pt c/o B hip pain, R > L. Started hurting at her buttocks and got X-rays done, showing some arthritis. Last week, her R hip was hurting so bad that she started dragging it.   MICHAEL: No specific MICHAEL, just happened on its own; has been going on for about 6 months   Current Pain: 2/10 Best Pain: 2/10 Worst Pain: 10/10  Constant/Intermittent: Constant, but fluctuates   Progression since onset: Worsening over time   Pain Location/Description: Buttocks R > L, into B hip bones, pointing to iliac crest   Current Symptoms: Aching at baseline, sharp pains every once in a while   Current Mobility: Amb with no AD   Aggravating/Relieving Factors: Aggravating: laying on R hip for too long, sitting for too long, standing for too long   Relieving: voltaren cream, Tylenol; has not tried ice or heat   Activity/Participation Limitations: Leads with LLE for stairs with step-to   Previous Treatment: H/o PT about 1 year ago for LBP, h/o x2 lumbar surgeries   Self Care: Independent  PLOF: Independent with all functional mobility, ADLs, IADLs  Home-Environment:

## 2025-06-03 NOTE — PROGRESS NOTES
[facility] [address]  [address]  [place] [address]  [phone] fax    Plan of Care / Statement of Necessity for Physical Therapy Services     Patient Name: Jacqueline Mota : 1960   Medical   Diagnosis: Pain in unspecified hip [M25.559]  Left hip pain [M25.552]  Right hip pain [M25.551] Treatment Diagnosis: M25.551  RIGHT HIP PAIN and M25.552  LEFT HIP PAIN       Onset Date: 25 (referral date)  Payor :  Payor: MEDICARE / Plan: MEDICARE PART A AND B / Product Type: *No Product type* /    Referral Source: Margot Mccartney* Start of Care (SOC): 6/3/2025   Prior Hospitalization: See medical history Provider #: [id number]   Prior Level of Function: Independent with all functional mobility, ADLs, IADLs; h/o x2 lumbar surgeries   Comorbidities: Social determinants of health: Depression  HTN, lupus, hx of migraines, lumbar fusion x2 (L3-L5 in , and L5-S1 in ), gall bladder removal, hysterectomy, h/o hernia repair 2024      Assessment / key information:  Patient is a pleasant 64 year old female with c/o B hip pain, R > L. Patient ambulates in clinic space with slight forward posturing and no AD. Patient presents with R > L post/lat hip pain, B hip weakness primarily into post/lat chain, pain in R hip with MMT of gross RLE, limited AROM R hip ER and flex vs L, decreased flexibility, and impaired functional mobility, which affects her QOL. Presents with significantly TTP R PSIS and (+) R innominate posterior rotation; addressed with sacral MET and long axis distraction of RLE with good return. Patient provided with HEP printout and performs initial exercises with moderate vc/tc and no adverse effect. Patient education on anatomy of present condition, symptom modulation, activity modification, HEP and importance of compliance, role of PT, and POC. Patient will benefit from skilled PT to address the above deficits and improve functional

## 2025-06-05 ENCOUNTER — HOSPITAL ENCOUNTER (OUTPATIENT)
Facility: HOSPITAL | Age: 65
Setting detail: RECURRING SERIES
Discharge: HOME | End: 2025-06-08
Payer: MEDICARE

## 2025-06-05 PROCEDURE — 97530 THERAPEUTIC ACTIVITIES: CPT

## 2025-06-05 PROCEDURE — 97110 THERAPEUTIC EXERCISES: CPT

## 2025-06-05 NOTE — PROGRESS NOTES
PHYSICAL / OCCUPATIONAL THERAPY - DAILY TREATMENT NOTE    Patient Name: Jacqueline Mota    Date: 2025    : 1960  Insurance: Payor: MEDICARE / Plan: MEDICARE PART A AND B / Product Type: *No Product type* /      Patient  verified Yes     Visit #   Current / Total 2 10   Time   In / Out 12:25 1:00   Pain   In / Out 2 0   Subjective Functional Status/Changes: I did a few ex's this morning     TREATMENT AREA =  Pain in unspecified hip    OBJECTIVE      Therapeutic Procedures:    Tx Min Billable or 1:1 Min (if diff from Tx Min) Procedure, Rationale, Specifics   20  76878 Therapeutic Exercise (timed):  increase ROM, strength, coordination, balance, and proprioception to improve patient's ability to progress to PLOF and address remaining functional goals. (see flow sheet as applicable)     Details if applicable:       15 15 19560 Therapeutic Activity (timed):  use of dynamic activities replicating functional movements to increase ROM, strength, coordination, balance, and proprioception in order to improve patient's ability to progress to PLOF and address remaining functional goals.  (see flow sheet as applicable)     Details if applicable:            Details if applicable:            Details if applicable:            Details if applicable:     35 35 [facility] Totals Reminder: bill using total billable min of TIMED therapeutic procedures (example: do not include dry needle or estim unattended, both untimed codes, in totals to left)  8-22 min = 1 unit; 23-37 min = 2 units; 38-52 min = 3 units; 53-67 min = 4 units; 68-82 min = 5 units   Total Total     Charge Capture    [x]  Patient Education billed concurrently with other procedures   [x] Review HEP    [] Progressed/Changed HEP, detail:    [] Other detail:       Objective Information/Functional Measures/Assessment  First follow-up after evaluation to address Pain in unspecified hip.  Initiated ex's with good tolerance.   Cues for correct form to engage

## 2025-06-10 ENCOUNTER — HOSPITAL ENCOUNTER (OUTPATIENT)
Facility: HOSPITAL | Age: 65
Setting detail: RECURRING SERIES
Discharge: HOME | End: 2025-06-13
Payer: MEDICARE

## 2025-06-10 PROCEDURE — 97110 THERAPEUTIC EXERCISES: CPT

## 2025-06-10 PROCEDURE — 97112 NEUROMUSCULAR REEDUCATION: CPT

## 2025-06-10 PROCEDURE — 97530 THERAPEUTIC ACTIVITIES: CPT

## 2025-06-10 NOTE — PROGRESS NOTES
improve patient's ability to perform dressing and transfers at home. (Initial LTG)  Eval: R hip ER 25 deg p! (45 on L); R hip flex 110 deg (120 deg L)    Current: added H/L piriformis stretch, hip/knee flex stretch at step this session, progressing (6/10/2025)     Pt will be able to improve strength in B hip extension and ABD to at least 4+/5 to improve patient's ability to perform standing and walking tasks at home & community. (Initial LTG)  Eval: Hip ext 4/5 L, 4-/5 R; Hip ABD 4/5 L, 4-/5 R   Current: cont strengthening ex, increased reps bridge this session, progressing (6/10/2025)      Pt will be able to improve 5xSTS score to </= 12 sec to improve transfer abilities and to decrease fall risk. (Initial LTG)  Eval: NT at IE -- will assess at 1st f/u   Current: 13\" from 18\" plinth without UE support, progressing (6/5/2025)    Pt will report compliance with home HEP at end of care to enhance carry over of fuctional gains made with skilled therapy services in order to improve quality of life. (Initial LTG)  Eval: Established first HEP at eval  Current: compliant with HEP, progressing (6/10/2025)     Pt will improve LEFS score to >/= 58 to demonstrate improvement in patient's ability to perform unrestricted ADLs/IADLs at home & community. (Initial LTG)  Eval: 49/80    Current: NT    Next PN/ RC due PN 7/2/2025, RC 7/2/2025  Auth due (visit number/ date) DANIELA    PLAN  - Continue Plan of Care  - Upgrade activities as tolerated    Amy Obregon, PT    6/10/2025    8:20 AM  If an interpreting service was utilized for treatment of this patient, the contents of this document represent the material reviewed with the patient via the .     Future Appointments   Date Time Provider Department Center   6/13/2025  8:20 AM Inez Mota, PT [facility]   6/17/2025  8:20 AM DANIEL OVIEDO [facility]   6/20/2025  8:20 AM DANIEL OVIEDO [facility]   9/22/2025  1:15 PM Son De La Cruz MD [facility] Sched

## 2025-06-13 ENCOUNTER — HOSPITAL ENCOUNTER (OUTPATIENT)
Facility: HOSPITAL | Age: 65
Setting detail: RECURRING SERIES
Discharge: HOME | End: 2025-06-16
Payer: MEDICARE

## 2025-06-13 PROCEDURE — 97112 NEUROMUSCULAR REEDUCATION: CPT

## 2025-06-13 PROCEDURE — 97110 THERAPEUTIC EXERCISES: CPT

## 2025-06-13 PROCEDURE — 97530 THERAPEUTIC ACTIVITIES: CPT

## 2025-06-13 NOTE — PROGRESS NOTES
PHYSICAL / OCCUPATIONAL THERAPY - DAILY TREATMENT NOTE    Patient Name: Jacqueline Mota    Date: 2025    : 1960  Insurance: Payor: MEDICARE / Plan: MEDICARE PART A AND B / Product Type: *No Product type* /      Patient  verified Yes     Visit #   Current / Total 4 10   Time   In / Out 820 9   Pain   In / Out 010  010   Subjective Functional Status/Changes: Patient denies pain this morning and reports compliance with HEP      TREATMENT AREA =  Pain in unspecified hip    OBJECTIVE    Therapeutic Procedures:    Tx Min Billable or 1:1 Min (if diff from Tx Min) Procedure, Rationale, Specifics   10  85351 Therapeutic Exercise (timed):  increase ROM, strength, coordination, balance, and proprioception to improve patient's ability to progress to PLOF and address remaining functional goals. (see flow sheet as applicable)     Details if applicable:  Hip/knee flex stretch at step, HS stretch at step, dewey wipers, H/L piriformis stretch     15  86960 Neuromuscular Re-Education (timed):  improve balance, coordination, kinesthetic sense, posture, core stability and proprioception to improve patient's ability to develop conscious control of individual muscles and awareness of position of extremities in order to progress to PLOF and address remaining functional goals. (see flow sheet as applicable)     Details if applicable:  Standing hip 3-way, standing march, H/L march   15  38773 Therapeutic Activity (timed):  use of dynamic activities replicating functional movements to increase ROM, strength, coordination, balance, and proprioception in order to improve patient's ability to progress to PLOF and address remaining functional goals.  (see flow sheet as applicable)     Details if applicable: Bike, step-ups, mini-squats, SS/retro ambulation, bridge   40  [facility] Totals Reminder: bill using total billable min of TIMED therapeutic procedures (example: do not include dry needle or estim unattended, both

## 2025-06-17 ENCOUNTER — HOSPITAL ENCOUNTER (OUTPATIENT)
Facility: HOSPITAL | Age: 65
Setting detail: RECURRING SERIES
Discharge: HOME | End: 2025-06-20
Payer: MEDICARE

## 2025-06-17 PROCEDURE — 97110 THERAPEUTIC EXERCISES: CPT

## 2025-06-17 PROCEDURE — 97112 NEUROMUSCULAR REEDUCATION: CPT

## 2025-06-17 PROCEDURE — 97530 THERAPEUTIC ACTIVITIES: CPT

## 2025-06-17 NOTE — PROGRESS NOTES
PHYSICAL / OCCUPATIONAL THERAPY - DAILY TREATMENT NOTE    Patient Name: Jacqueline Mota    Date: 2025    : 1960  Insurance: Payor: MEDICARE / Plan: MEDICARE PART A AND B / Product Type: *No Product type* /      Patient  verified Yes     Visit #   Current / Total 5 24   Time   In / Out 8:25 9:07   Pain   In / Out 2 0   Subjective Functional Status/Changes: I was able to clean my house for 3 hours and no issues     TREATMENT AREA =  Pain in unspecified hip    OBJECTIVE      Therapeutic Procedures:    Tx Min Billable or 1:1 Min (if diff from Tx Min) Procedure, Rationale, Specifics   12 12 23055 Therapeutic Exercise (timed):  increase ROM, strength, coordination, balance, and proprioception to improve patient's ability to progress to PLOF and address remaining functional goals. (see flow sheet as applicable)     Details if applicable:       16 16 67741 Neuromuscular Re-Education (timed):  improve balance, coordination, kinesthetic sense, posture, core stability and proprioception to improve patient's ability to develop conscious control of individual muscles and awareness of position of extremities in order to progress to PLOF and address remaining functional goals. (see flow sheet as applicable)     Details if applicable:     14 14 05214 Therapeutic Activity (timed):  use of dynamic activities replicating functional movements to increase ROM, strength, coordination, balance, and proprioception in order to improve patient's ability to progress to PLOF and address remaining functional goals.  (see flow sheet as applicable)     Details if applicable:            Details if applicable:            Details if applicable:     42 42 [facility] Totals Reminder: bill using total billable min of TIMED therapeutic procedures (example: do not include dry needle or estim unattended, both untimed codes, in totals to left)  8-22 min = 1 unit; 23-37 min = 2 units; 38-52 min = 3 units; 53-67 min = 4 units; 68-82 min = 5

## 2025-06-20 ENCOUNTER — HOSPITAL ENCOUNTER (OUTPATIENT)
Facility: HOSPITAL | Age: 65
Setting detail: RECURRING SERIES
Discharge: HOME | End: 2025-06-23
Payer: MEDICARE

## 2025-06-20 PROCEDURE — 97112 NEUROMUSCULAR REEDUCATION: CPT

## 2025-06-20 PROCEDURE — 97530 THERAPEUTIC ACTIVITIES: CPT

## 2025-06-20 PROCEDURE — 97110 THERAPEUTIC EXERCISES: CPT

## 2025-06-20 NOTE — PROGRESS NOTES
PHYSICAL / OCCUPATIONAL THERAPY - DAILY TREATMENT NOTE    Patient Name: Jacqueline Mota    Date: 2025    : 1960  Insurance: Payor: MEDICARE / Plan: MEDICARE PART A AND B / Product Type: *No Product type* /      Patient  verified Yes     Visit #   Current / Total 6 24   Time   In / Out 8:22 9:20   Pain   In / Out 2 2   Subjective Functional Status/Changes: I had a fall 2 days ago.  I was sitting in my craft room, and  reached forward to get something from the floor and the chair slipped from behind me and I fell to the floor on  my hip       TREATMENT AREA =  Pain in unspecified hip    OBJECTIVE    Modalities Rationale:     decrease inflammation and decrease pain to improve patient's ability to progress to PLOF and address remaining functional goals.     min [] Estim Unattended, type/location:                                      []  w/ice    []  w/heat    min [] Estim Attended, type/location:                                     []  w/US     []  w/ice    []  w/heat    []  TENS insruct      min []  Mechanical Traction: type/lbs                   []  pro   []  sup   []  int   []  cont    []  before manual    []  after manual    min []  Ultrasound, settings/location:     10 min  unbill [x]  Ice     []  Heat    location/position: Right hip    min []  Paraffin,  details:     min []  Vasopneumatic Device, press/temp:     min []  Whirlpool / Fluido:    If using vaso (only need to measure limb vaso being performed on)      pre-treatment girth :       post-treatment girth :       measured at (landmark location) :      min []  Other:    Skin assessment post-treatment:   Intact      Therapeutic Procedures:    Tx Min Billable or 1:1 Min (if diff from Tx Min) Procedure, Rationale, Specifics   15 15 03580 Therapeutic Activity (timed):  use of dynamic activities replicating functional movements to increase ROM, strength, coordination, balance, and proprioception in order to improve patient's ability to progress

## 2025-07-01 ENCOUNTER — HOSPITAL ENCOUNTER (OUTPATIENT)
Facility: HOSPITAL | Age: 65
Setting detail: RECURRING SERIES
Discharge: HOME | End: 2025-07-04
Payer: MEDICARE

## 2025-07-01 PROCEDURE — 97530 THERAPEUTIC ACTIVITIES: CPT

## 2025-07-01 PROCEDURE — 97110 THERAPEUTIC EXERCISES: CPT

## 2025-07-01 NOTE — PROGRESS NOTES
extension and ABD to at least 4+/5 to improve patient's ability to perform standing and walking tasks at home & community. (Initial LTG)  Eval: Hip ext 4/5 L, 4-/5 R; Hip ABD 4/5 L, 4-/5 R   Current: cont strengthening ex, increased reps bridge this session, progressing (6/10/2025)   current: addressing with strengthening exercise per flowsheet (6/13/25)   progressing: hip abduction  right 4+/5,  left 5/5;  extension right 4-/5,  left 4+/5 (7/1/2025)   Pt will be able to improve 5xSTS score to </= 12 sec to improve transfer abilities and to decrease fall risk. (Initial LTG)  Eval: NT at  -- will assess at 1st f/u   Current: met: 10 seconds from 18 \" bed  (7/1/2025)     Pt will report compliance with home HEP at end of care to enhance carry over of fuctional gains made with skilled therapy services in order to improve quality of life. (Initial LTG)  Eval: Established first HEP at eval  Current: compliant with HEP, progressing (7/1/2025)     Pt will improve LEFS score to >/= 58 to demonstrate improvement in patient's ability to perform unrestricted ADLs/IADLs at home & community. (Initial LTG)  Eval: 49/80               Current: progressing       07/01/25 0844   The Lower Extremity Functional Scale   Any of your usual work, housework, or school activities 3   Your usual hobbies, recreational, or sporting activities 3   Getting into or out of the bath 3   Walking between rooms 4   Putting on your shoes or socks 4   Squatting 3   Lifting an object, like a bag of groceries from the floor 4   Performing light activities around your home 4   Performing heavy activities around your home 3   Getting into or out of a car 4   Walking 2 blocks 3   Walking a mile 3   Going up or down 10 stairs (about 1 flight of stairs) 3   Standing for 1 hour 2   Sitting for 1 hour 3   Running on even ground  1   Running on uneven ground  0   Making sharp turns while running fast  0   Hopping 2   Rolling over in bed 3   LEFS Total Score 5 
[facility]   7/15/2025  8:20 AM Herber Garcia, PTA [facility]   7/17/2025  8:20 AM Amy Obregon, PT [facility]   9/22/2025  1:15 PM Son De La Cruz MD [facility] Sched

## 2025-07-03 ENCOUNTER — HOSPITAL ENCOUNTER (OUTPATIENT)
Facility: HOSPITAL | Age: 65
Setting detail: RECURRING SERIES
Discharge: HOME | End: 2025-07-06
Payer: MEDICARE

## 2025-07-03 PROCEDURE — 97110 THERAPEUTIC EXERCISES: CPT

## 2025-07-03 PROCEDURE — 97112 NEUROMUSCULAR REEDUCATION: CPT

## 2025-07-03 PROCEDURE — 97530 THERAPEUTIC ACTIVITIES: CPT

## 2025-07-03 NOTE — PROGRESS NOTES
PHYSICAL / OCCUPATIONAL THERAPY - DAILY TREATMENT NOTE    Patient Name: Jacqueline Mota    Date: 7/3/2025    : 1960  Insurance: Payor: MEDICARE / Plan: MEDICARE PART A AND B / Product Type: *No Product type* /      Patient  verified Yes     Visit #   Current / Total 1 12   Time   In / Out 8:20 9:10   Pain   In / Out 2 1   Subjective Functional Status/Changes: My hips are sore, but the ex's do help     TREATMENT AREA =  Pain in unspecified hip    OBJECTIVE    Modalities Rationale:     decrease inflammation and decrease pain to improve patient's ability to progress to PLOF and address remaining functional goals.     min [] Estim Unattended, type/location:                                      []  w/ice    []  w/heat    min [] Estim Attended, type/location:                                     []  w/US     []  w/ice    []  w/heat    []  TENS insruct      min []  Mechanical Traction: type/lbs                   []  pro   []  sup   []  int   []  cont    []  before manual    []  after manual    min []  Ultrasound, settings/location:     10 min  unbill [x]  Ice     []  Heat    location/position: Supine, bilateral hips    min []  Paraffin,  details:     min []  Vasopneumatic Device, press/temp:     min []  Whirlpool / Fluido:    If using vaso (only need to measure limb vaso being performed on)      pre-treatment girth :       post-treatment girth :       measured at (landmark location) :      min []  Other:    Skin assessment post-treatment:   Intact      Therapeutic Procedures:    Tx Min Billable or 1:1 Min (if diff from Tx Min) Procedure, Rationale, Specifics    39778 Therapeutic Exercise (timed):  increase ROM, strength, coordination, balance, and proprioception to improve patient's ability to progress to PLOF and address remaining functional goals. (see flow sheet as applicable)     Details if applicable:       10 10 75327 Neuromuscular Re-Education (timed):  improve balance, coordination, kinesthetic

## 2025-07-08 ENCOUNTER — HOSPITAL ENCOUNTER (OUTPATIENT)
Facility: HOSPITAL | Age: 65
Setting detail: RECURRING SERIES
Discharge: HOME | End: 2025-07-11
Payer: MEDICARE

## 2025-07-08 PROCEDURE — 97110 THERAPEUTIC EXERCISES: CPT

## 2025-07-08 PROCEDURE — 97530 THERAPEUTIC ACTIVITIES: CPT

## 2025-07-08 PROCEDURE — 97112 NEUROMUSCULAR REEDUCATION: CPT

## 2025-07-08 NOTE — PROGRESS NOTES
ER to >/= 35 deg and R hip flex to >/= 120 deg to improve patient's ability to perform dressing and transfers at home. (Initial LTG)   Status at last note/certification: right ER 35 degrees, hip flexion supine 115 degrees (7/1/2025)  Current: cont LE stretches,  progressing (7/8/2025)  Pt will be able to improve strength in B hip extension and ABD to at least 4+/5 to improve patient's ability to perform standing and walking tasks at home & community. (Initial LTG)  Status at last note/certification: hip abduction  right 4+/5,  left 5/5;  extension right 4-/5,  left 4+/5 (7/1/2025)  Current: cont LE strengthening, progressing (7/8/2025)   Pt will report compliance with updated home HEP at end of care to enhance carry over of fuctional gains made with skilled therapy services in order to improve quality of life. (Initial LTG)  Status at last note/certification: compliant with HEP,  progressing (7/1/2025)  Current: partially compliant due to having to travel, progressing (7/8/2025)   Pt will improve LEFS score to >/= 58 to demonstrate improvement in patient's ability to perform unrestricted ADLs/IADLs at home & community. (Initial LTG)  Status at last note/certification:  54 (7/1/2025)  Current: NT               Next PN/ RC due PN/RC 7/31/2025  Auth due (visit number/ date) DANIELA    PLAN  - Continue Plan of Care  - Upgrade activities as tolerated    Amy Obregon PT    7/8/2025    8:18 AM  If an interpreting service was utilized for treatment of this patient, the contents of this document represent the material reviewed with the patient via the .     Future Appointments   Date Time Provider Department Center   7/8/2025  8:20 AM Amy Obregon PT [facility]   7/10/2025  8:20 AM Amy Obregon PT [facility]   7/15/2025  8:20 AM Herber Garcia PTA [facility]   7/17/2025  8:20 AM Amy Obregon PT [facility]   9/22/2025  1:15 PM Son De La Cruz MD [facility] Sched

## 2025-07-10 ENCOUNTER — HOSPITAL ENCOUNTER (OUTPATIENT)
Facility: HOSPITAL | Age: 65
Setting detail: RECURRING SERIES
Discharge: HOME | End: 2025-07-13
Payer: MEDICARE

## 2025-07-10 PROCEDURE — 97110 THERAPEUTIC EXERCISES: CPT

## 2025-07-10 PROCEDURE — 97112 NEUROMUSCULAR REEDUCATION: CPT

## 2025-07-10 NOTE — PROGRESS NOTES
PHYSICAL / OCCUPATIONAL THERAPY - DAILY TREATMENT NOTE    Patient Name: Jacqueline Mota    Date: 7/10/2025    : 1960  Insurance: Payor: MEDICARE / Plan: MEDICARE PART A AND B / Product Type: *No Product type* /      Patient  verified Yes     Visit #   Current / Total 3 12   Time   In / Out 8:18 8:57   Pain   In / Out 3/10 1/10   Subjective Functional Status/Changes: My left hip is bothering me when I walk.      TREATMENT AREA =  Pain in unspecified hip    OBJECTIVE         Therapeutic Procedures:    Tx Min Billable or 1:1 Min (if diff from Tx Min) Procedure, Rationale, Specifics   25 25 73549 Therapeutic Exercise (timed):  increase ROM, strength, coordination, balance, and proprioception to improve patient's ability to progress to PLOF and address remaining functional goals. (see flow sheet as applicable)     Details if applicable:        28181 Neuromuscular Re-Education (timed):  improve balance, coordination, kinesthetic sense, posture, core stability and proprioception to improve patient's ability to develop conscious control of individual muscles and awareness of position of extremities in order to progress to PLOF and address remaining functional goals. (see flow sheet as applicable)     Details if applicable:                    39 39 [facility] Totals Reminder: bill using total billable min of TIMED therapeutic procedures (example: do not include dry needle or estim unattended, both untimed codes, in totals to left)  8-22 min = 1 unit; 23-37 min = 2 units; 38-52 min = 3 units; 53-67 min = 4 units; 68-82 min = 5 units   Total Total     Charge Capture    [x]  Patient Education billed concurrently with other procedures   [x] Review HEP    [] Progressed/Changed HEP, detail:    [] Other detail:       Objective Information/Functional Measures/Assessment    Pt reports to skilled therapy session with increased bilateral hip pain with ADLs. Session started with exercise bike and stretches at stairs in

## 2025-07-15 ENCOUNTER — HOSPITAL ENCOUNTER (OUTPATIENT)
Facility: HOSPITAL | Age: 65
Setting detail: RECURRING SERIES
Discharge: HOME | End: 2025-07-18
Payer: MEDICARE

## 2025-07-15 PROCEDURE — 97110 THERAPEUTIC EXERCISES: CPT

## 2025-07-15 PROCEDURE — 97140 MANUAL THERAPY 1/> REGIONS: CPT

## 2025-07-15 PROCEDURE — 97530 THERAPEUTIC ACTIVITIES: CPT

## 2025-07-15 PROCEDURE — 97112 NEUROMUSCULAR REEDUCATION: CPT

## 2025-07-15 NOTE — PROGRESS NOTES
PHYSICAL / OCCUPATIONAL THERAPY - DAILY TREATMENT NOTE    Patient Name: Jacqueline Mota    Date: 7/15/2025    : 1960  Insurance: Payor: MEDICARE / Plan: MEDICARE PART A AND B / Product Type: *No Product type* /      Patient  verified Yes     Visit #   Current / Total 4 12   Time   In / Out 8:21 9:26   Pain   In / Out /10 0/10   Subjective Functional Status/Changes: My right hip has been bothering me more often     TREATMENT AREA =  Pain in unspecified hip    OBJECTIVE    Modalities Rationale:     decrease inflammation and decrease pain to improve patient's ability to progress to PLOF and address remaining functional goals.     min [] Estim Unattended, type/location:                                      []  w/ice    []  w/heat    min [] Estim Attended, type/location:                                     []  w/US     []  w/ice    []  w/heat    []  TENS insruct      min []  Mechanical Traction: type/lbs                   []  pro   []  sup   []  int   []  cont    []  before manual    []  after manual    min []  Ultrasound, settings/location:     10 min  unbill [x]  Ice     []  Heat    location/position: Seated: lower back.    min []  Paraffin,  details:     min []  Vasopneumatic Device, press/temp:     min []  Whirlpool / Fluido:    If using vaso (only need to measure limb vaso being performed on)      pre-treatment girth :       post-treatment girth :       measured at (landmark location) :      min []  Other:    Skin assessment post-treatment:   Intact      Therapeutic Procedures:    Tx Min Billable or 1:1 Min (if diff from Tx Min) Procedure, Rationale, Specifics   15 15 25403 Manual Therapy (timed):  decrease pain, increase ROM, increase tissue extensibility, and decrease trigger points to improve patient's ability to progress to PLOF and address remaining functional goals.  The manual therapy interventions were performed at a separate and distinct time from the therapeutic activities interventions .

## 2025-07-17 ENCOUNTER — HOSPITAL ENCOUNTER (OUTPATIENT)
Facility: HOSPITAL | Age: 65
Setting detail: RECURRING SERIES
Discharge: HOME | End: 2025-07-20
Payer: MEDICARE

## 2025-07-17 PROCEDURE — 97112 NEUROMUSCULAR REEDUCATION: CPT

## 2025-07-17 PROCEDURE — 97530 THERAPEUTIC ACTIVITIES: CPT

## 2025-07-17 PROCEDURE — 97110 THERAPEUTIC EXERCISES: CPT

## 2025-07-17 NOTE — PROGRESS NOTES
PHYSICAL / OCCUPATIONAL THERAPY - DAILY TREATMENT NOTE    Patient Name: Jacqueline Mota    Date: 2025    : 1960  Insurance: Payor: MEDICARE / Plan: MEDICARE PART A AND B / Product Type: *No Product type* /      Patient  verified Yes     Visit #   Current / Total 5 12   Time   In / Out 8:18 8:58   Pain   In / Out 1/10 0/10   Subjective Functional Status/Changes: Pt reports compliance with HEP, states that she feels ready for DC with HEP this session.  Pt reports that she feels weak/fatigued today due to lupus.     TREATMENT AREA =  Pain in unspecified hip    OBJECTIVE    Therapeutic Procedures:    Tx Min Billable or 1:1 Min (if diff from Tx Min) Procedure, Rationale, Specifics    18 96062 Therapeutic Exercise (timed):  increase ROM, strength, coordination, balance, and proprioception to improve patient's ability to progress to PLOF and address remaining functional goals. (see flow sheet as applicable)     Details if applicable:  ROM/MMT, H/L piriformis stretch (ER/IR), hip flexor/HS stretch at step      10 17641 Neuromuscular Re-Education (timed):  improve balance, coordination, kinesthetic sense, posture, core stability and proprioception to improve patient's ability to develop conscious control of individual muscles and awareness of position of extremities in order to progress to PLOF and address remaining functional goals. (see flow sheet as applicable)     Details if applicable:  Clam (ER/IR/abd)    12 46982 Therapeutic Activity (timed):  use of dynamic activities replicating functional movements to increase ROM, strength, coordination, balance, and proprioception in order to improve patient's ability to progress to PLOF and address remaining functional goals.  (see flow sheet as applicable)     Details if applicable:  REYES Ramirez              40 [facility] Totals Reminder: bill using total billable min of TIMED therapeutic procedures (example: do not include dry needle or estim unattended, both 
training, modalities for symptom management and patient education (including HEP).  Patient has progressed well with exercises in clinic and demonstrates independence with HEP.  ROM/strength have improved.  LEFS score has improved, indicating improved activity tolerance and overall function.  Patient is reporting decreased pain overall (0-1/10 this session).    ROM/Strength                   AROM                          PROM                       Strength (1-5)  Hip Left Right Left Right Left Right   Flexion 105 110      5/5  5/5    Extension NT   NT     5/5 4/5   Abduction  NT  NT     5/5 5/5   Adduction  NT  NT           ER 40 35     5/5 5/5   IR 40 35      5/5  5/5   Knee Left Right Left Right Left Right   Extension  NT NT      5/5 5/5 p!   Flexion  NT NT      5/5 5/5 p!                    07/17/25 0823   The Lower Extremity Functional Scale   Any of your usual work, housework, or school activities 4   Your usual hobbies, recreational, or sporting activities 3   Getting into or out of the bath 4   Walking between rooms 4   Putting on your shoes or socks 4   Squatting 3   Lifting an object, like a bag of groceries from the floor 4   Performing light activities around your home 4   Performing heavy activities around your home 3   Getting into or out of a car 4   Walking 2 blocks 3   Walking a mile 3   Going up or down 10 stairs (about 1 flight of stairs) 3   Standing for 1 hour 3   Sitting for 1 hour 4   Running on even ground  2   Running on uneven ground  2   Making sharp turns while running fast  2   Hopping 3   Rolling over in bed 4   LEFS Total Score 66      RECOMMENDATIONS  [x]Discontinue therapy: [x]Patient has reached or is progressing toward set goals      []Patient is non-compliant or has abdicated      []Due to lack of appreciable progress towards set goals    If you have any questions/comments please contact us directly at [phone].   Thank you for allowing us to assist in the care of your

## 2025-07-17 NOTE — THERAPY DISCHARGE
Physical Therapy Discharge Instructions      [facility]  [address] [place] [address] [phone] fax      Patient: Jacqueline Mota  : 1960      Continue Home Exercise Program 1 times per day for 4 weeks, then decrease to 3 times per week      Continue with   [x] Ice  as needed    [] Heat           Follow up with MD:     [] Upon completion of therapy     [x] As needed      Recommendations:     [x]   Return to activity with home program    []   Return to activity with the following modifications:       []Post Rehab Program    []Join Independent aquatic program     []Return to/join local gym      Amy Obregon, PT 2025 8:38 AM    If an interpreting service was utilized for treatment of this patient, the contents of this document represent the material reviewed with the patient via the .

## 2025-08-28 ENCOUNTER — HOSPITAL ENCOUNTER (OUTPATIENT)
Facility: HOSPITAL | Age: 65
Setting detail: RECURRING SERIES
Discharge: HOME | End: 2025-08-31
Payer: MEDICARE

## 2025-08-28 PROCEDURE — 97110 THERAPEUTIC EXERCISES: CPT

## 2025-08-28 PROCEDURE — 97161 PT EVAL LOW COMPLEX 20 MIN: CPT

## 2025-09-03 ENCOUNTER — HOSPITAL ENCOUNTER (OUTPATIENT)
Facility: HOSPITAL | Age: 65
Setting detail: RECURRING SERIES
Discharge: HOME | End: 2025-09-06
Payer: MEDICARE

## 2025-09-03 PROCEDURE — 97140 MANUAL THERAPY 1/> REGIONS: CPT

## 2025-09-03 PROCEDURE — 97110 THERAPEUTIC EXERCISES: CPT
